# Patient Record
Sex: FEMALE | Race: WHITE
[De-identification: names, ages, dates, MRNs, and addresses within clinical notes are randomized per-mention and may not be internally consistent; named-entity substitution may affect disease eponyms.]

---

## 2017-04-19 ENCOUNTER — APPOINTMENT (OUTPATIENT)
Dept: SURGERY | Facility: CLINIC | Age: 52
End: 2017-04-19

## 2017-05-19 ENCOUNTER — APPOINTMENT (OUTPATIENT)
Dept: SURGERY | Facility: CLINIC | Age: 52
End: 2017-05-19

## 2017-05-19 VITALS
SYSTOLIC BLOOD PRESSURE: 138 MMHG | HEIGHT: 63.5 IN | BODY MASS INDEX: 51.27 KG/M2 | DIASTOLIC BLOOD PRESSURE: 84 MMHG | WEIGHT: 293 LBS

## 2017-05-19 DIAGNOSIS — Z82.49 FAMILY HISTORY OF ISCHEMIC HEART DISEASE AND OTHER DISEASES OF THE CIRCULATORY SYSTEM: ICD-10-CM

## 2017-05-19 DIAGNOSIS — Z87.891 PERSONAL HISTORY OF NICOTINE DEPENDENCE: ICD-10-CM

## 2017-05-19 DIAGNOSIS — C53.9 MALIGNANT NEOPLASM OF CERVIX UTERI, UNSPECIFIED: ICD-10-CM

## 2017-05-19 DIAGNOSIS — Z78.9 OTHER SPECIFIED HEALTH STATUS: ICD-10-CM

## 2017-05-19 RX ORDER — PROGESTERONE 200 MG/1
CAPSULE ORAL
Refills: 0 | Status: ACTIVE | COMMUNITY

## 2017-05-19 RX ORDER — ESTRADIOL 0.1MG/24HR
0.1 PATCH, TRANSDERMAL SEMIWEEKLY TRANSDERMAL
Refills: 0 | Status: ACTIVE | COMMUNITY

## 2017-05-25 ENCOUNTER — APPOINTMENT (OUTPATIENT)
Dept: SURGERY | Facility: CLINIC | Age: 52
End: 2017-05-25

## 2017-05-25 VITALS — HEIGHT: 63.5 IN | BODY MASS INDEX: 51.27 KG/M2 | WEIGHT: 293 LBS

## 2017-05-30 ENCOUNTER — OUTPATIENT (OUTPATIENT)
Dept: OUTPATIENT SERVICES | Facility: HOSPITAL | Age: 52
LOS: 1 days | Discharge: HOME | End: 2017-05-30

## 2017-05-31 ENCOUNTER — RESULT REVIEW (OUTPATIENT)
Age: 52
End: 2017-05-31

## 2017-06-12 ENCOUNTER — APPOINTMENT (OUTPATIENT)
Dept: SURGERY | Facility: CLINIC | Age: 52
End: 2017-06-12

## 2017-06-12 VITALS — WEIGHT: 293 LBS | BODY MASS INDEX: 51.27 KG/M2 | HEIGHT: 63.5 IN

## 2017-06-28 DIAGNOSIS — E66.01 MORBID (SEVERE) OBESITY DUE TO EXCESS CALORIES: ICD-10-CM

## 2017-07-18 ENCOUNTER — APPOINTMENT (OUTPATIENT)
Dept: SURGERY | Facility: CLINIC | Age: 52
End: 2017-07-18

## 2017-07-18 VITALS — WEIGHT: 292 LBS | HEIGHT: 63.5 IN | BODY MASS INDEX: 51.1 KG/M2

## 2017-08-07 ENCOUNTER — APPOINTMENT (OUTPATIENT)
Dept: SURGERY | Facility: CLINIC | Age: 52
End: 2017-08-07
Payer: COMMERCIAL

## 2017-08-07 VITALS — HEIGHT: 63.5 IN | BODY MASS INDEX: 50.57 KG/M2 | WEIGHT: 289 LBS

## 2017-08-07 PROCEDURE — 99212 OFFICE O/P EST SF 10 MIN: CPT

## 2017-08-14 ENCOUNTER — OUTPATIENT (OUTPATIENT)
Dept: OUTPATIENT SERVICES | Facility: HOSPITAL | Age: 52
LOS: 1 days | Discharge: HOME | End: 2017-08-14

## 2017-08-14 DIAGNOSIS — I10 ESSENTIAL (PRIMARY) HYPERTENSION: ICD-10-CM

## 2017-08-14 DIAGNOSIS — G47.33 OBSTRUCTIVE SLEEP APNEA (ADULT) (PEDIATRIC): ICD-10-CM

## 2017-08-14 DIAGNOSIS — E56.9 VITAMIN DEFICIENCY, UNSPECIFIED: ICD-10-CM

## 2017-08-14 DIAGNOSIS — R00.2 PALPITATIONS: ICD-10-CM

## 2017-08-14 DIAGNOSIS — E66.9 OBESITY, UNSPECIFIED: ICD-10-CM

## 2017-08-14 DIAGNOSIS — I36.0 NONRHEUMATIC TRICUSPID (VALVE) STENOSIS: ICD-10-CM

## 2017-08-14 DIAGNOSIS — E78.00 PURE HYPERCHOLESTEROLEMIA, UNSPECIFIED: ICD-10-CM

## 2017-09-26 ENCOUNTER — APPOINTMENT (OUTPATIENT)
Dept: SURGERY | Facility: CLINIC | Age: 52
End: 2017-09-26
Payer: COMMERCIAL

## 2017-09-26 VITALS — HEIGHT: 63.5 IN | BODY MASS INDEX: 50.47 KG/M2 | WEIGHT: 288.4 LBS

## 2017-09-26 PROCEDURE — 99212 OFFICE O/P EST SF 10 MIN: CPT

## 2017-10-12 ENCOUNTER — APPOINTMENT (OUTPATIENT)
Dept: SURGERY | Facility: CLINIC | Age: 52
End: 2017-10-12
Payer: COMMERCIAL

## 2017-10-12 VITALS — BODY MASS INDEX: 50.5 KG/M2 | HEIGHT: 63.5 IN | WEIGHT: 288.6 LBS

## 2017-10-12 PROCEDURE — 99212 OFFICE O/P EST SF 10 MIN: CPT

## 2017-12-05 ENCOUNTER — TRANSCRIPTION ENCOUNTER (OUTPATIENT)
Age: 52
End: 2017-12-05

## 2017-12-12 ENCOUNTER — OUTPATIENT (OUTPATIENT)
Dept: OUTPATIENT SERVICES | Facility: HOSPITAL | Age: 52
LOS: 1 days | Discharge: HOME | End: 2017-12-12

## 2017-12-12 DIAGNOSIS — E66.9 OBESITY, UNSPECIFIED: ICD-10-CM

## 2017-12-12 DIAGNOSIS — E66.01 MORBID (SEVERE) OBESITY DUE TO EXCESS CALORIES: ICD-10-CM

## 2017-12-12 DIAGNOSIS — Z01.818 ENCOUNTER FOR OTHER PREPROCEDURAL EXAMINATION: ICD-10-CM

## 2017-12-12 DIAGNOSIS — G47.33 OBSTRUCTIVE SLEEP APNEA (ADULT) (PEDIATRIC): ICD-10-CM

## 2017-12-12 DIAGNOSIS — I10 ESSENTIAL (PRIMARY) HYPERTENSION: ICD-10-CM

## 2017-12-13 ENCOUNTER — APPOINTMENT (OUTPATIENT)
Dept: SURGERY | Facility: CLINIC | Age: 52
End: 2017-12-13
Payer: COMMERCIAL

## 2017-12-13 VITALS
HEIGHT: 63.5 IN | SYSTOLIC BLOOD PRESSURE: 140 MMHG | DIASTOLIC BLOOD PRESSURE: 82 MMHG | BODY MASS INDEX: 49.56 KG/M2 | WEIGHT: 283.2 LBS

## 2017-12-13 PROCEDURE — 99215 OFFICE O/P EST HI 40 MIN: CPT

## 2017-12-26 ENCOUNTER — INPATIENT (INPATIENT)
Facility: HOSPITAL | Age: 52
LOS: 1 days | Discharge: HOME | End: 2017-12-28
Attending: SURGERY

## 2017-12-26 DIAGNOSIS — E66.01 MORBID (SEVERE) OBESITY DUE TO EXCESS CALORIES: ICD-10-CM

## 2017-12-26 DIAGNOSIS — I10 ESSENTIAL (PRIMARY) HYPERTENSION: ICD-10-CM

## 2017-12-26 DIAGNOSIS — G47.33 OBSTRUCTIVE SLEEP APNEA (ADULT) (PEDIATRIC): ICD-10-CM

## 2017-12-26 DIAGNOSIS — E66.9 OBESITY, UNSPECIFIED: ICD-10-CM

## 2017-12-28 ENCOUNTER — CLINICAL ADVICE (OUTPATIENT)
Age: 52
End: 2017-12-28

## 2017-12-29 ENCOUNTER — CLINICAL ADVICE (OUTPATIENT)
Age: 52
End: 2017-12-29

## 2018-01-02 ENCOUNTER — OUTPATIENT (OUTPATIENT)
Dept: OUTPATIENT SERVICES | Facility: HOSPITAL | Age: 53
LOS: 1 days | Discharge: HOME | End: 2018-01-02

## 2018-01-02 DIAGNOSIS — E66.01 MORBID (SEVERE) OBESITY DUE TO EXCESS CALORIES: ICD-10-CM

## 2018-01-02 DIAGNOSIS — I10 ESSENTIAL (PRIMARY) HYPERTENSION: ICD-10-CM

## 2018-01-02 DIAGNOSIS — Z87.891 PERSONAL HISTORY OF NICOTINE DEPENDENCE: ICD-10-CM

## 2018-01-02 DIAGNOSIS — E66.2 MORBID (SEVERE) OBESITY WITH ALVEOLAR HYPOVENTILATION: ICD-10-CM

## 2018-01-02 DIAGNOSIS — Z90.49 ACQUIRED ABSENCE OF OTHER SPECIFIED PARTS OF DIGESTIVE TRACT: ICD-10-CM

## 2018-01-02 DIAGNOSIS — Z85.41 PERSONAL HISTORY OF MALIGNANT NEOPLASM OF CERVIX UTERI: ICD-10-CM

## 2018-01-02 DIAGNOSIS — G47.33 OBSTRUCTIVE SLEEP APNEA (ADULT) (PEDIATRIC): ICD-10-CM

## 2018-01-02 DIAGNOSIS — E66.9 OBESITY, UNSPECIFIED: ICD-10-CM

## 2018-01-03 ENCOUNTER — APPOINTMENT (OUTPATIENT)
Dept: SURGERY | Facility: CLINIC | Age: 53
End: 2018-01-03
Payer: COMMERCIAL

## 2018-01-03 VITALS — BODY MASS INDEX: 45.78 KG/M2 | WEIGHT: 261.6 LBS | HEIGHT: 63.5 IN

## 2018-01-03 PROCEDURE — 99024 POSTOP FOLLOW-UP VISIT: CPT

## 2018-01-03 RX ORDER — CALCIUM CARBONATE/VITAMIN D3 500 MG-2.5
TABLET,CHEWABLE ORAL DAILY
Refills: 0 | Status: ACTIVE | COMMUNITY

## 2018-01-09 ENCOUNTER — OTHER (OUTPATIENT)
Age: 53
End: 2018-01-09

## 2018-01-10 ENCOUNTER — APPOINTMENT (OUTPATIENT)
Dept: SURGERY | Facility: CLINIC | Age: 53
End: 2018-01-10
Payer: COMMERCIAL

## 2018-01-24 ENCOUNTER — APPOINTMENT (OUTPATIENT)
Dept: SURGERY | Facility: CLINIC | Age: 53
End: 2018-01-24
Payer: COMMERCIAL

## 2018-01-24 VITALS — HEIGHT: 63.5 IN | WEIGHT: 254.4 LBS | BODY MASS INDEX: 44.52 KG/M2

## 2018-01-24 PROCEDURE — 99024 POSTOP FOLLOW-UP VISIT: CPT

## 2018-01-24 RX ORDER — OXYCODONE AND ACETAMINOPHEN 5; 325 MG/1; MG/1
5-325 TABLET ORAL EVERY 6 HOURS
Qty: 15 | Refills: 0 | Status: DISCONTINUED | COMMUNITY
Start: 2017-12-13 | End: 2018-01-24

## 2018-03-12 ENCOUNTER — LABORATORY RESULT (OUTPATIENT)
Age: 53
End: 2018-03-12

## 2018-03-12 ENCOUNTER — OUTPATIENT (OUTPATIENT)
Dept: OUTPATIENT SERVICES | Facility: HOSPITAL | Age: 53
LOS: 1 days | Discharge: HOME | End: 2018-03-12

## 2018-03-12 DIAGNOSIS — E66.01 MORBID (SEVERE) OBESITY DUE TO EXCESS CALORIES: ICD-10-CM

## 2018-03-21 ENCOUNTER — APPOINTMENT (OUTPATIENT)
Dept: SURGERY | Facility: CLINIC | Age: 53
End: 2018-03-21
Payer: COMMERCIAL

## 2018-03-21 VITALS — HEIGHT: 63.5 IN | BODY MASS INDEX: 40.98 KG/M2 | WEIGHT: 234.2 LBS

## 2018-03-21 PROCEDURE — 99024 POSTOP FOLLOW-UP VISIT: CPT

## 2018-03-21 RX ORDER — BIOTIN 10 MG
10000 TABLET ORAL
Refills: 0 | Status: ACTIVE | COMMUNITY

## 2018-03-28 ENCOUNTER — RX RENEWAL (OUTPATIENT)
Age: 53
End: 2018-03-28

## 2018-04-25 ENCOUNTER — APPOINTMENT (OUTPATIENT)
Dept: SURGERY | Facility: CLINIC | Age: 53
End: 2018-04-25
Payer: COMMERCIAL

## 2018-04-25 VITALS — BODY MASS INDEX: 39.65 KG/M2 | WEIGHT: 226.6 LBS | HEIGHT: 63.5 IN

## 2018-04-25 DIAGNOSIS — E66.01 MORBID (SEVERE) OBESITY DUE TO EXCESS CALORIES: ICD-10-CM

## 2018-04-25 DIAGNOSIS — G89.29 LOW BACK PAIN: ICD-10-CM

## 2018-04-25 DIAGNOSIS — Z86.79 PERSONAL HISTORY OF OTHER DISEASES OF THE CIRCULATORY SYSTEM: ICD-10-CM

## 2018-04-25 DIAGNOSIS — M54.5 LOW BACK PAIN: ICD-10-CM

## 2018-04-25 PROCEDURE — 99213 OFFICE O/P EST LOW 20 MIN: CPT

## 2018-04-25 RX ORDER — PANTOPRAZOLE 40 MG/1
40 TABLET, DELAYED RELEASE ORAL DAILY
Qty: 30 | Refills: 2 | Status: DISCONTINUED | COMMUNITY
Start: 2017-12-13 | End: 2018-04-25

## 2018-06-13 ENCOUNTER — OUTPATIENT (OUTPATIENT)
Dept: OUTPATIENT SERVICES | Facility: HOSPITAL | Age: 53
LOS: 1 days | Discharge: HOME | End: 2018-06-13

## 2018-06-13 DIAGNOSIS — E66.01 MORBID (SEVERE) OBESITY DUE TO EXCESS CALORIES: ICD-10-CM

## 2018-06-29 ENCOUNTER — APPOINTMENT (OUTPATIENT)
Dept: SURGERY | Facility: CLINIC | Age: 53
End: 2018-06-29
Payer: COMMERCIAL

## 2018-06-29 VITALS — HEIGHT: 63.5 IN | BODY MASS INDEX: 36.83 KG/M2 | WEIGHT: 210.5 LBS

## 2018-06-29 LAB
25(OH)D3 SERPL-MCNC: 40 NG/ML
ALBUMIN SERPL ELPH-MCNC: 3.8 G/DL
ALP BLD-CCNC: 60 U/L
ALT SERPL-CCNC: 12 U/L
ANION GAP SERPL CALC-SCNC: 19 MMOL/L
AST SERPL-CCNC: 16 U/L
BASOPHILS # BLD AUTO: 0.03 K/UL
BASOPHILS NFR BLD AUTO: 0.5 %
BILIRUB SERPL-MCNC: 0.6 MG/DL
BUN SERPL-MCNC: 15 MG/DL
CALCIUM SERPL-MCNC: 9.2 MG/DL
CALCIUM SERPL-MCNC: 9.2 MG/DL
CHLORIDE SERPL-SCNC: 101 MMOL/L
CHOLEST SERPL-MCNC: 167 MG/DL
CHOLEST/HDLC SERPL: 2.7 RATIO
CO2 SERPL-SCNC: 24 MMOL/L
CREAT SERPL-MCNC: 0.9 MG/DL
EOSINOPHIL # BLD AUTO: 0.03 K/UL
EOSINOPHIL NFR BLD AUTO: 0.5 %
FERRITIN SERPL-MCNC: 158 NG/ML
FOLATE RBC-MCNC: 2345 NG/ML
GLUCOSE SERPL-MCNC: 75 MG/DL
HCT VFR BLD CALC: 43.9 %
HCT VFR BLD CALC: 45 %
HDLC SERPL-MCNC: 63 MG/DL
HGB BLD-MCNC: 14.1 G/DL
IMM GRANULOCYTES NFR BLD AUTO: 0.2 %
IRON SATN MFR SERPL: 40 %
IRON SERPL-MCNC: 104 UG/DL
LDLC SERPL CALC-MCNC: 88 MG/DL
LYMPHOCYTES # BLD AUTO: 1.93 K/UL
LYMPHOCYTES NFR BLD AUTO: 30.7 %
MAN DIFF?: NORMAL
MCHC RBC-ENTMCNC: 30 PG
MCHC RBC-ENTMCNC: 32.1 G/DL
MCV RBC AUTO: 93.4 FL
MONOCYTES # BLD AUTO: 0.39 K/UL
MONOCYTES NFR BLD AUTO: 6.2 %
NEUTROPHILS # BLD AUTO: 3.9 K/UL
NEUTROPHILS NFR BLD AUTO: 61.9 %
PARATHYROID HORMONE INTACT: 48 PG/ML
PLATELET # BLD AUTO: 213 K/UL
POTASSIUM SERPL-SCNC: 4 MMOL/L
PROT SERPL-MCNC: 6.5 G/DL
RBC # BLD: 4.7 M/UL
RBC # FLD: 14 %
SODIUM SERPL-SCNC: 144 MMOL/L
TIBC SERPL-MCNC: 261 UG/DL
TRIGL SERPL-MCNC: 122 MG/DL
UIBC SERPL-MCNC: 157 UG/DL
VIT B1 SERPL-MCNC: 125.6 NMOL/L
VIT B12 SERPL-MCNC: >2000 PG/ML
WBC # FLD AUTO: 6.29 K/UL

## 2018-06-29 PROCEDURE — 99213 OFFICE O/P EST LOW 20 MIN: CPT

## 2018-06-29 RX ORDER — PEDIATRIC MULTIVITAMIN NO.17
TABLET,CHEWABLE ORAL DAILY
Refills: 0 | Status: ACTIVE | COMMUNITY

## 2018-07-27 PROBLEM — Z78.9 ALCOHOL USE: Status: ACTIVE | Noted: 2017-05-19

## 2018-09-19 ENCOUNTER — LABORATORY RESULT (OUTPATIENT)
Age: 53
End: 2018-09-19

## 2018-09-19 ENCOUNTER — OUTPATIENT (OUTPATIENT)
Dept: OUTPATIENT SERVICES | Facility: HOSPITAL | Age: 53
LOS: 1 days | Discharge: HOME | End: 2018-09-19

## 2018-09-19 DIAGNOSIS — K91.2 POSTSURGICAL MALABSORPTION, NOT ELSEWHERE CLASSIFIED: ICD-10-CM

## 2018-09-19 DIAGNOSIS — E78.89 OTHER LIPOPROTEIN METABOLISM DISORDERS: ICD-10-CM

## 2018-09-19 DIAGNOSIS — D51.1 VITAMIN B12 DEFICIENCY ANEMIA DUE TO SELECTIVE VITAMIN B12 MALABSORPTION WITH PROTEINURIA: ICD-10-CM

## 2018-09-19 DIAGNOSIS — E87.8 OTHER DISORDERS OF ELECTROLYTE AND FLUID BALANCE, NOT ELSEWHERE CLASSIFIED: ICD-10-CM

## 2018-09-19 DIAGNOSIS — D50.8 OTHER IRON DEFICIENCY ANEMIAS: ICD-10-CM

## 2018-09-19 DIAGNOSIS — E56.9 VITAMIN DEFICIENCY, UNSPECIFIED: ICD-10-CM

## 2018-10-12 ENCOUNTER — APPOINTMENT (OUTPATIENT)
Dept: SURGERY | Facility: CLINIC | Age: 53
End: 2018-10-12
Payer: COMMERCIAL

## 2018-10-12 VITALS — WEIGHT: 192.1 LBS | HEIGHT: 63.5 IN | BODY MASS INDEX: 33.62 KG/M2

## 2018-10-12 DIAGNOSIS — K21.9 GASTRO-ESOPHAGEAL REFLUX DISEASE W/OUT ESOPHAGITIS: ICD-10-CM

## 2018-10-12 PROCEDURE — 99213 OFFICE O/P EST LOW 20 MIN: CPT

## 2018-10-12 RX ORDER — PANTOPRAZOLE 40 MG/1
40 TABLET, DELAYED RELEASE ORAL DAILY
Qty: 30 | Refills: 2 | Status: DISCONTINUED | COMMUNITY
Start: 2018-03-28 | End: 2018-10-12

## 2019-01-07 ENCOUNTER — OUTPATIENT (OUTPATIENT)
Dept: OUTPATIENT SERVICES | Facility: HOSPITAL | Age: 54
LOS: 1 days | Discharge: HOME | End: 2019-01-07

## 2019-01-07 DIAGNOSIS — D50.1 SIDEROPENIC DYSPHAGIA: ICD-10-CM

## 2019-01-07 DIAGNOSIS — E21.3 HYPERPARATHYROIDISM, UNSPECIFIED: ICD-10-CM

## 2019-01-07 DIAGNOSIS — D50.8 OTHER IRON DEFICIENCY ANEMIAS: ICD-10-CM

## 2019-01-07 DIAGNOSIS — E87.8 OTHER DISORDERS OF ELECTROLYTE AND FLUID BALANCE, NOT ELSEWHERE CLASSIFIED: ICD-10-CM

## 2019-01-07 DIAGNOSIS — E78.89 OTHER LIPOPROTEIN METABOLISM DISORDERS: ICD-10-CM

## 2019-01-07 DIAGNOSIS — D51.1 VITAMIN B12 DEFICIENCY ANEMIA DUE TO SELECTIVE VITAMIN B12 MALABSORPTION WITH PROTEINURIA: ICD-10-CM

## 2019-01-07 DIAGNOSIS — E55.9 VITAMIN D DEFICIENCY, UNSPECIFIED: ICD-10-CM

## 2019-01-07 DIAGNOSIS — K91.2 POSTSURGICAL MALABSORPTION, NOT ELSEWHERE CLASSIFIED: ICD-10-CM

## 2019-01-07 DIAGNOSIS — E56.9 VITAMIN DEFICIENCY, UNSPECIFIED: ICD-10-CM

## 2019-01-14 ENCOUNTER — RESULT REVIEW (OUTPATIENT)
Age: 54
End: 2019-01-14

## 2019-01-14 LAB
25(OH)D3 SERPL-MCNC: 42 NG/ML
ALBUMIN SERPL ELPH-MCNC: 4.1 G/DL
ALP BLD-CCNC: 53 U/L
ALT SERPL-CCNC: 14 U/L
ANION GAP SERPL CALC-SCNC: 9 MMOL/L
AST SERPL-CCNC: 15 U/L
BASOPHILS # BLD AUTO: 0.02 K/UL
BASOPHILS NFR BLD AUTO: 0.5 %
BILIRUB SERPL-MCNC: 0.6 MG/DL
BUN SERPL-MCNC: 13 MG/DL
CALCIUM SERPL-MCNC: 9.2 MG/DL
CALCIUM SERPL-MCNC: 9.2 MG/DL
CHLORIDE SERPL-SCNC: 105 MMOL/L
CHOLEST SERPL-MCNC: 187 MG/DL
CHOLEST/HDLC SERPL: 2.6 RATIO
CO2 SERPL-SCNC: 31 MMOL/L
CREAT SERPL-MCNC: 0.8 MG/DL
EOSINOPHIL # BLD AUTO: 0.02 K/UL
EOSINOPHIL NFR BLD AUTO: 0.5 %
FERRITIN SERPL-MCNC: 173 NG/ML
FOLATE RBC-MCNC: 1407 NG/ML
GLUCOSE SERPL-MCNC: 87 MG/DL
HCT VFR BLD CALC: 43 %
HCT VFR BLD CALC: 43.2 %
HDLC SERPL-MCNC: 72 MG/DL
HGB BLD-MCNC: 13.8 G/DL
IMM GRANULOCYTES NFR BLD AUTO: 0.2 %
IRON SATN MFR SERPL: 48 %
IRON SERPL-MCNC: 135 UG/DL
LDLC SERPL CALC-MCNC: 114 MG/DL
LYMPHOCYTES # BLD AUTO: 1.56 K/UL
LYMPHOCYTES NFR BLD AUTO: 38.8 %
MAN DIFF?: NORMAL
MCHC RBC-ENTMCNC: 30.4 PG
MCHC RBC-ENTMCNC: 31.9 G/DL
MCV RBC AUTO: 95.2 FL
MONOCYTES # BLD AUTO: 0.29 K/UL
MONOCYTES NFR BLD AUTO: 7.2 %
NEUTROPHILS # BLD AUTO: 2.12 K/UL
NEUTROPHILS NFR BLD AUTO: 52.8 %
PARATHYROID HORMONE INTACT: 57 PG/ML
PLATELET # BLD AUTO: 193 K/UL
POTASSIUM SERPL-SCNC: 5.2 MMOL/L
PROT SERPL-MCNC: 6.6 G/DL
RBC # BLD: 4.54 M/UL
RBC # FLD: 13.2 %
SODIUM SERPL-SCNC: 145 MMOL/L
TIBC SERPL-MCNC: 282 UG/DL
TRIGL SERPL-MCNC: 82 MG/DL
UIBC SERPL-MCNC: 147 UG/DL
VIT B1 SERPL-MCNC: 141.7 NMOL/L
VIT B12 SERPL-MCNC: 1675 PG/ML
WBC # FLD AUTO: 4.02 K/UL

## 2019-01-18 ENCOUNTER — APPOINTMENT (OUTPATIENT)
Dept: SURGERY | Facility: CLINIC | Age: 54
End: 2019-01-18

## 2019-05-07 ENCOUNTER — OUTPATIENT (OUTPATIENT)
Dept: OUTPATIENT SERVICES | Facility: HOSPITAL | Age: 54
LOS: 1 days | Discharge: HOME | End: 2019-05-07
Payer: COMMERCIAL

## 2019-05-07 DIAGNOSIS — Z12.31 ENCOUNTER FOR SCREENING MAMMOGRAM FOR MALIGNANT NEOPLASM OF BREAST: ICD-10-CM

## 2019-05-07 PROCEDURE — 77063 BREAST TOMOSYNTHESIS BI: CPT | Mod: 26

## 2019-05-07 PROCEDURE — 77067 SCR MAMMO BI INCL CAD: CPT | Mod: 26

## 2019-05-13 ENCOUNTER — APPOINTMENT (OUTPATIENT)
Dept: SURGERY | Facility: CLINIC | Age: 54
End: 2019-05-13
Payer: COMMERCIAL

## 2019-05-13 VITALS — HEIGHT: 63.5 IN | BODY MASS INDEX: 32.69 KG/M2 | WEIGHT: 186.8 LBS

## 2019-05-13 PROCEDURE — 99213 OFFICE O/P EST LOW 20 MIN: CPT

## 2019-05-13 RX ORDER — NYSTATIN 100000 1/G
POWDER TOPICAL DAILY
Refills: 0 | Status: DISCONTINUED | COMMUNITY
End: 2019-05-13

## 2019-05-13 NOTE — ASSESSMENT
[FreeTextEntry1] : TJ CUNNINGHAM is a 53 year female seen today for Bariatric follow up visit. Patient is doing well with her weight loss goals.\par Patient is compliant with dietary guidelines.\par She eats 3 meals/day with an occasional healthful snack. Breakfast - egg, a slice of cheese with turkey.  Lunch - tuna fish or turkey and cheese rolled up.  Dinner - chicken, steak, shrimp with either a green salad or green leafy vegetables. Snacks - almonds, protein shake,potato chip or pretzels. Recommend that she reduce fatty and high calorie foods. Reviewed healthy protein snack ideas with patient.\par Fluid intake is adequate with mainly water, Crystal Light and 1 cup of coffee. \par She has not exercised in 1 month.  Made patient aware that if she wants to lose another 10 lbs she has to resume her exercise 4-5 days week and add weight bearing exercises.\par \par

## 2019-05-13 NOTE — REASON FOR VISIT
[Follow-Up Visit] : a follow-up visit for [Other___] : [unfilled] [FreeTextEntry2] : Sleeve Gastrectomy on 12/26/2017

## 2019-05-13 NOTE — HISTORY OF PRESENT ILLNESS
[Procedure: ___] : Procedure performed: [unfilled]  [Date of Surgery: ___] : Date of Surgery:   [unfilled] [Pre-Op Weight ___] : Pre-op weight was [unfilled] lbs [___ Years Post Op] : [unfilled] years [de-identified] : Patient had surgery approximately 1 1/2 years ago. \par She denies heartburn/reflux/vomiting and food intolerance.\par Her hypertension, GERD and LBP all resolved.  She is not using her CPAP machine and was again referred to pulmonology.\par \par

## 2019-05-13 NOTE — PLAN
[FreeTextEntry1] : PLAN: Resume exercise.\par            Support Group.\par            Follow up with pulmonary.\par            Return to office in 2 months.\par            Call with concerns.

## 2019-05-13 NOTE — DATA REVIEWED
[FreeTextEntry1] : \par Wt. change since last visit: -5.3 lbs \par %EWL: 58 \par TWL:96.4 lbs \par BMI: 32.5\par \par

## 2019-07-02 ENCOUNTER — APPOINTMENT (OUTPATIENT)
Dept: SURGERY | Facility: CLINIC | Age: 54
End: 2019-07-02

## 2020-05-20 ENCOUNTER — TRANSCRIPTION ENCOUNTER (OUTPATIENT)
Age: 55
End: 2020-05-20

## 2020-08-24 ENCOUNTER — INPATIENT (INPATIENT)
Facility: HOSPITAL | Age: 55
LOS: 3 days | Discharge: HOME | End: 2020-08-28
Attending: INTERNAL MEDICINE | Admitting: INTERNAL MEDICINE
Payer: COMMERCIAL

## 2020-08-24 VITALS
SYSTOLIC BLOOD PRESSURE: 119 MMHG | DIASTOLIC BLOOD PRESSURE: 70 MMHG | RESPIRATION RATE: 20 BRPM | TEMPERATURE: 97 F | OXYGEN SATURATION: 100 % | HEART RATE: 66 BPM

## 2020-08-24 LAB
ALBUMIN SERPL ELPH-MCNC: 4.3 G/DL — SIGNIFICANT CHANGE UP (ref 3.5–5.2)
ALP SERPL-CCNC: 46 U/L — SIGNIFICANT CHANGE UP (ref 30–115)
ALT FLD-CCNC: 19 U/L — SIGNIFICANT CHANGE UP (ref 0–41)
ANION GAP SERPL CALC-SCNC: 15 MMOL/L — HIGH (ref 7–14)
AST SERPL-CCNC: 21 U/L — SIGNIFICANT CHANGE UP (ref 0–41)
BASOPHILS # BLD AUTO: 0.02 K/UL — SIGNIFICANT CHANGE UP (ref 0–0.2)
BASOPHILS NFR BLD AUTO: 0.3 % — SIGNIFICANT CHANGE UP (ref 0–1)
BILIRUB SERPL-MCNC: 0.5 MG/DL — SIGNIFICANT CHANGE UP (ref 0.2–1.2)
BUN SERPL-MCNC: 19 MG/DL — SIGNIFICANT CHANGE UP (ref 10–20)
CALCIUM SERPL-MCNC: 9.5 MG/DL — SIGNIFICANT CHANGE UP (ref 8.5–10.1)
CHLORIDE SERPL-SCNC: 101 MMOL/L — SIGNIFICANT CHANGE UP (ref 98–110)
CO2 SERPL-SCNC: 24 MMOL/L — SIGNIFICANT CHANGE UP (ref 17–32)
CREAT SERPL-MCNC: 1 MG/DL — SIGNIFICANT CHANGE UP (ref 0.7–1.5)
EOSINOPHIL # BLD AUTO: 0 K/UL — SIGNIFICANT CHANGE UP (ref 0–0.7)
EOSINOPHIL NFR BLD AUTO: 0 % — SIGNIFICANT CHANGE UP (ref 0–8)
GLUCOSE SERPL-MCNC: 102 MG/DL — HIGH (ref 70–99)
HCT VFR BLD CALC: 43.5 % — SIGNIFICANT CHANGE UP (ref 37–47)
HGB BLD-MCNC: 14 G/DL — SIGNIFICANT CHANGE UP (ref 12–16)
IMM GRANULOCYTES NFR BLD AUTO: 0.3 % — SIGNIFICANT CHANGE UP (ref 0.1–0.3)
LYMPHOCYTES # BLD AUTO: 1.14 K/UL — LOW (ref 1.2–3.4)
LYMPHOCYTES # BLD AUTO: 14.6 % — LOW (ref 20.5–51.1)
MCHC RBC-ENTMCNC: 31.1 PG — HIGH (ref 27–31)
MCHC RBC-ENTMCNC: 32.2 G/DL — SIGNIFICANT CHANGE UP (ref 32–37)
MCV RBC AUTO: 96.7 FL — SIGNIFICANT CHANGE UP (ref 81–99)
MONOCYTES # BLD AUTO: 0.2 K/UL — SIGNIFICANT CHANGE UP (ref 0.1–0.6)
MONOCYTES NFR BLD AUTO: 2.6 % — SIGNIFICANT CHANGE UP (ref 1.7–9.3)
NEUTROPHILS # BLD AUTO: 6.41 K/UL — SIGNIFICANT CHANGE UP (ref 1.4–6.5)
NEUTROPHILS NFR BLD AUTO: 82.2 % — HIGH (ref 42.2–75.2)
NRBC # BLD: 0 /100 WBCS — SIGNIFICANT CHANGE UP (ref 0–0)
PLATELET # BLD AUTO: 205 K/UL — SIGNIFICANT CHANGE UP (ref 130–400)
POTASSIUM SERPL-MCNC: 5.4 MMOL/L — HIGH (ref 3.5–5)
POTASSIUM SERPL-SCNC: 5.4 MMOL/L — HIGH (ref 3.5–5)
PROT SERPL-MCNC: 7 G/DL — SIGNIFICANT CHANGE UP (ref 6–8)
RBC # BLD: 4.5 M/UL — SIGNIFICANT CHANGE UP (ref 4.2–5.4)
RBC # FLD: 13 % — SIGNIFICANT CHANGE UP (ref 11.5–14.5)
SODIUM SERPL-SCNC: 140 MMOL/L — SIGNIFICANT CHANGE UP (ref 135–146)
WBC # BLD: 7.79 K/UL — SIGNIFICANT CHANGE UP (ref 4.8–10.8)
WBC # FLD AUTO: 7.79 K/UL — SIGNIFICANT CHANGE UP (ref 4.8–10.8)

## 2020-08-24 PROCEDURE — 99285 EMERGENCY DEPT VISIT HI MDM: CPT

## 2020-08-24 RX ORDER — OXYCODONE AND ACETAMINOPHEN 5; 325 MG/1; MG/1
1 TABLET ORAL ONCE
Refills: 0 | Status: DISCONTINUED | OUTPATIENT
Start: 2020-08-24 | End: 2020-08-24

## 2020-08-24 RX ORDER — ENOXAPARIN SODIUM 100 MG/ML
40 INJECTION SUBCUTANEOUS DAILY
Refills: 0 | Status: DISCONTINUED | OUTPATIENT
Start: 2020-08-24 | End: 2020-08-27

## 2020-08-24 RX ORDER — CHLORHEXIDINE GLUCONATE 213 G/1000ML
1 SOLUTION TOPICAL
Refills: 0 | Status: DISCONTINUED | OUTPATIENT
Start: 2020-08-24 | End: 2020-08-28

## 2020-08-24 RX ORDER — METHOCARBAMOL 500 MG/1
1000 TABLET, FILM COATED ORAL ONCE
Refills: 0 | Status: COMPLETED | OUTPATIENT
Start: 2020-08-24 | End: 2020-08-24

## 2020-08-24 RX ORDER — KETOROLAC TROMETHAMINE 30 MG/ML
30 SYRINGE (ML) INJECTION ONCE
Refills: 0 | Status: DISCONTINUED | OUTPATIENT
Start: 2020-08-24 | End: 2020-08-24

## 2020-08-24 RX ORDER — MORPHINE SULFATE 50 MG/1
4 CAPSULE, EXTENDED RELEASE ORAL ONCE
Refills: 0 | Status: DISCONTINUED | OUTPATIENT
Start: 2020-08-24 | End: 2020-08-24

## 2020-08-24 RX ADMIN — OXYCODONE AND ACETAMINOPHEN 1 TABLET(S): 5; 325 TABLET ORAL at 18:19

## 2020-08-24 RX ADMIN — Medication 30 MILLIGRAM(S): at 18:02

## 2020-08-24 RX ADMIN — Medication 30 MILLIGRAM(S): at 19:07

## 2020-08-24 RX ADMIN — MORPHINE SULFATE 4 MILLIGRAM(S): 50 CAPSULE, EXTENDED RELEASE ORAL at 20:25

## 2020-08-24 RX ADMIN — OXYCODONE AND ACETAMINOPHEN 1 TABLET(S): 5; 325 TABLET ORAL at 20:00

## 2020-08-24 RX ADMIN — METHOCARBAMOL 1000 MILLIGRAM(S): 500 TABLET, FILM COATED ORAL at 18:02

## 2020-08-24 RX ADMIN — OXYCODONE AND ACETAMINOPHEN 1 TABLET(S): 5; 325 TABLET ORAL at 19:07

## 2020-08-24 RX ADMIN — OXYCODONE AND ACETAMINOPHEN 1 TABLET(S): 5; 325 TABLET ORAL at 19:18

## 2020-08-24 NOTE — ED PROVIDER NOTE - ATTENDING CONTRIBUTION TO CARE
56 y/o female denies sig PMH, PSH of bariatric surgery c/o acute onset rt lower back painx 1 week, started after exercising, sx are constant, worse with certain movements and position, better with rest, denies fever, tactile temp, chills, paresthesias, focal weakness, bowel or bladder dysfunction, urinary sx, LE weakness, saddle anesthesia, or other associated complaints at present. Pt denies recent heavy lifting or trauma.     No old chart available for review.  I have reviewed and agree with the nursing note, except as documented in my note.    VSS, awake, alert, appears uncomfortable, chest CTAB, +S1/S2, RRR, abdomen soft, NT, no pulsatile masses or bruits appreciated, no midline spinal tenderness, step-offs or deformities, no erythema, swelling or ecchymosis, no skin rash or lesions, able to dorsiflex b/l great toe, motor and sensation intact b/l LE, NV intact, unable to ambulate secondary to pain.

## 2020-08-24 NOTE — ED PROVIDER NOTE - OBJECTIVE STATEMENT
55 year old female, no significant past medical history, who presents with R lower back pain radiating to RLE x1 week. Patient reports she was stretching when she felt "pop" with gradual worsening of pain. Pain was lying supine today when pain worsened, has been unable to ambulate since. No fever, chills, IVDU, urinary/bowel incontinence, saddle anesthesias, UE/LE tingling/numbness. Denies recent falls or trauma. Patient reports hx similar in past x5 years ago, had MRI performed at that time, unknown results.

## 2020-08-24 NOTE — H&P ADULT - NSHPLABSRESULTS_GEN_ALL_CORE
14.0   7.79  )-----------( 205      ( 24 Aug 2020 20:10 )             43.5       08-24    140  |  101  |  19  ----------------------------<  102<H>  5.4<H>   |  24  |  1.0    Ca    9.5      24 Aug 2020 20:10    TPro  7.0  /  Alb  4.3  /  TBili  0.5  /  DBili  x   /  AST  21  /  ALT  19  /  AlkPhos  46  08-24                            CAPILLARY BLOOD GLUCOSE

## 2020-08-24 NOTE — ED PROVIDER NOTE - PHYSICAL EXAMINATION
CONSTITUTIONAL: Well-developed; well-nourished; in no acute distress, nontoxic appearing  SKIN: skin exam is warm and dry,  HEAD: Normocephalic; atraumatic.  NECK: ROM intact  CARD: S1, S2 normal, no murmur  RESP: No wheezes, rales or rhonchi. Good air movement bilaterally  ABD: soft; non-distended; non-tender. No Rebound, No guarding  EXT: +R paraspinal TTP overlying lumbar region. No midline tenderness. Normal ROM of extremities. Pulses 2+ bilaterally LE. Pelvis stable.    NEURO: awake, alert, following commands, oriented, grossly unremarkable. No Focal deficits. GCS 15.   PSYCH: Cooperative, appropriate.

## 2020-08-24 NOTE — ED ADULT TRIAGE NOTE - CHIEF COMPLAINT QUOTE
pt BIBA for severe hip pain that radiates into the leg. pt is not able to ambulate at this time. pt noted with injury a few days ago. no deformity noted to the hip

## 2020-08-24 NOTE — ED PROVIDER NOTE - NS ED ROS FT
Review of Systems:  	•	CONSTITUTIONAL: no fever, no diaphoresis, no chills  	•	SKIN: no rash  	•	HEMATOLOGIC: no bleeding, no bruising  	•	RESPIRATORY: no shortness of breath, no cough  	•	CARDIAC: no chest pain, no palpitations  	•	GI: no abd pain, no nausea, no vomiting, no diarrhea  	•	GENITO-URINARY:  no dysuria; no hematuria, no increased urinary frequency  	•	MUSCULOSKELETAL: +R lower back pain. no swelling, no redness  	•	NEUROLOGIC: no urinary/bowel incontinence, no saddle anesthesias, no LE tingling/numbness/paresthesias

## 2020-08-24 NOTE — H&P ADULT - NSHPPHYSICALEXAM_GEN_ALL_CORE
VITALS:     GENERAL: NAD, lying in bed comfortably  HEAD:  Atraumatic, Normocephalic  EYES: EOMI, PERRLA, conjunctiva and sclera clear  ENT: Moist mucous membranes  NECK: Supple, No JVD  CHEST/LUNG: Clear to auscultation bilaterally; No rales, rhonchi, wheezing, or rubs. Unlabored respirations  HEART: Regular rate and rhythm; No murmurs, rubs, or gallops  ABDOMEN: Bowel sounds present; Soft, Nontender, Nondistended. No hepatomegally  EXTREMITIES:  2+ Peripheral Pulses, brisk capillary refill. No clubbing, cyanosis, or edema  NERVOUS SYSTEM:  Alert & Oriented X3, speech clear. No deficits   MSK: FROM all 4 extremities, full and equal strength  SKIN: No rashes or lesions GENERAL: NAD, lying in bed comfortably  HEAD:  Atraumatic, Normocephalic  EYES: EOMI, PERRLA, conjunctiva and sclera clear  ENT: Moist mucous membranes  NECK: Supple, No JVD  CHEST/LUNG: Clear to auscultation bilaterally; No rales, rhonchi, wheezing, or rubs. Unlabored respirations  HEART: Regular rate and rhythm; No murmurs, rubs, or gallops  ABDOMEN: Bowel sounds present; Soft, Nontender, Nondistended.   EXTREMITIES:  2+ Peripheral Pulses, brisk capillary refill. No clubbing, cyanosis, or edema, no back paraspinal tenderness, STR leg raise test negative bilaterally  NERVOUS SYSTEM:  Alert & Oriented X3, speech clear. intact sensory, motor limited on the right by pain  MSK: FROM all 4 extremities,  SKIN: No rashes or lesions

## 2020-08-24 NOTE — H&P ADULT - ASSESSMENT
56 y/o female denies sig PMH, PSH of bariatric surgery c/o acute onset rt lower back painx 1 week.    #back pain  -pain control consult  -PT shahrzad  -c/w methacarbamol, tylenols percocet and morphine for breakthrough pain   -avoid NSAID as pt have hyperkalemia       Activity as toelrated,   Diet regular  DVT PPX lovenox   GI ppx not indicated   Dispo from home   Code full 56 y/o female denies sig PMH, PSH of bariatric surgery c/o acute onset rt lower back painx 1 week.    #Lower back, thigh and knee pain with paresthesia, most likely MSK  -acute pain control   -PT shahrzad and treat  -c/w methocarbamol tylenols percocet and morphine for breakthrough pain   -avoid NSAID as pt have hyperkalemia   -no indication for MRI of the back for now    Activity as tolerated   Diet regular  DVT PPX Lovenox   GI ppx not indicated   Dispo from home   Code full

## 2020-08-24 NOTE — H&P ADULT - HISTORY OF PRESENT ILLNESS
54 y/o female denies sig PMH, PSH of bariatric surgery c/o acute onset rt lower back painx 1 week.      In the ED was given percocet, morphine, methocarbamol and Toradol, admitted for pain control 56 y/o female no PMH, PSH of bariatric surgery c/o acute onset rt lower back painx 1 day.  Pt started home exercising 3 weeks, since then she had back pain radiating to the thighs and knees, worsened by any movement, improved with rest, associated with numbness at the same site, no focal weakness, no urinary or stool incontinence. no recent fall or trauma.       In the ED was given percocet, morphine, methocarbamol and Toradol, admitted for pain control

## 2020-08-24 NOTE — H&P ADULT - ATTENDING COMMENTS
56 YO F with no significant PMH, PSH of bariatric surgery who presents to the hospital with a c/o acute onset of right lower back pain for the past x 1 week that started after exercising. Denies any trauma/falls, bowel/bladder incontinence, saddle paresthesia, IVDA, rashes, fevers/chills, focal weakness, or sensation changes. In the ED, pt was given multiple rounds of pain medications and muscle-relaxants with minimal relief. Admitted for pain control.     Physical exam shows pt in NAD. VSS, afebrile, not hypoxic on RA. A&Ox3. Non-focal neuro exam. Muscle strength/sensation intact. CTA B/L with no W/C/R. RRR, no M/G/R. ABD is soft and non-tender, normoactive BSs. LEs without swelling, -SLR B/L, no back pain on palpation. No rashes. Labs and radiology as above.    Acute back pain, non-traumatic, likely musculoskeletal; doubt cord compression. Lidocaine patch. PRN pain meds. PT consult.     Hyperkalemia. Treat. Repeat BMP in the AM.     Restart home meds. DVT PPX. Inform PCP of pt's admission to hospital. My note supersedes the residents note.

## 2020-08-24 NOTE — ED PROVIDER NOTE - PROGRESS NOTE DETAILS
BH: pt still in pain despite multiple analgesics, will admit for further evaluation and management. BH: pt still in pain despite multiple analgesics, unable to ambulate, will admit for further evaluation and management.

## 2020-08-25 DIAGNOSIS — M54.9 DORSALGIA, UNSPECIFIED: ICD-10-CM

## 2020-08-25 LAB
ALBUMIN SERPL ELPH-MCNC: 3.9 G/DL — SIGNIFICANT CHANGE UP (ref 3.5–5.2)
ALP SERPL-CCNC: 72 U/L — SIGNIFICANT CHANGE UP (ref 30–115)
ALT FLD-CCNC: 43 U/L — HIGH (ref 0–41)
ANION GAP SERPL CALC-SCNC: 11 MMOL/L — SIGNIFICANT CHANGE UP (ref 7–14)
AST SERPL-CCNC: 51 U/L — HIGH (ref 0–41)
BASOPHILS # BLD AUTO: 0.02 K/UL — SIGNIFICANT CHANGE UP (ref 0–0.2)
BASOPHILS NFR BLD AUTO: 0.3 % — SIGNIFICANT CHANGE UP (ref 0–1)
BILIRUB SERPL-MCNC: 0.7 MG/DL — SIGNIFICANT CHANGE UP (ref 0.2–1.2)
BUN SERPL-MCNC: 17 MG/DL — SIGNIFICANT CHANGE UP (ref 10–20)
CALCIUM SERPL-MCNC: 9.1 MG/DL — SIGNIFICANT CHANGE UP (ref 8.5–10.1)
CHLORIDE SERPL-SCNC: 106 MMOL/L — SIGNIFICANT CHANGE UP (ref 98–110)
CO2 SERPL-SCNC: 26 MMOL/L — SIGNIFICANT CHANGE UP (ref 17–32)
CREAT SERPL-MCNC: 0.9 MG/DL — SIGNIFICANT CHANGE UP (ref 0.7–1.5)
EOSINOPHIL # BLD AUTO: 0.03 K/UL — SIGNIFICANT CHANGE UP (ref 0–0.7)
EOSINOPHIL NFR BLD AUTO: 0.5 % — SIGNIFICANT CHANGE UP (ref 0–8)
GLUCOSE SERPL-MCNC: 84 MG/DL — SIGNIFICANT CHANGE UP (ref 70–99)
HCT VFR BLD CALC: 40.9 % — SIGNIFICANT CHANGE UP (ref 37–47)
HCV AB S/CO SERPL IA: 0.04 COI — SIGNIFICANT CHANGE UP
HCV AB SERPL-IMP: SIGNIFICANT CHANGE UP
HGB BLD-MCNC: 13.3 G/DL — SIGNIFICANT CHANGE UP (ref 12–16)
IMM GRANULOCYTES NFR BLD AUTO: 0.2 % — SIGNIFICANT CHANGE UP (ref 0.1–0.3)
LYMPHOCYTES # BLD AUTO: 1.92 K/UL — SIGNIFICANT CHANGE UP (ref 1.2–3.4)
LYMPHOCYTES # BLD AUTO: 30.5 % — SIGNIFICANT CHANGE UP (ref 20.5–51.1)
MAGNESIUM SERPL-MCNC: 2.1 MG/DL — SIGNIFICANT CHANGE UP (ref 1.8–2.4)
MCHC RBC-ENTMCNC: 31.3 PG — HIGH (ref 27–31)
MCHC RBC-ENTMCNC: 32.5 G/DL — SIGNIFICANT CHANGE UP (ref 32–37)
MCV RBC AUTO: 96.2 FL — SIGNIFICANT CHANGE UP (ref 81–99)
MONOCYTES # BLD AUTO: 0.55 K/UL — SIGNIFICANT CHANGE UP (ref 0.1–0.6)
MONOCYTES NFR BLD AUTO: 8.7 % — SIGNIFICANT CHANGE UP (ref 1.7–9.3)
NEUTROPHILS # BLD AUTO: 3.76 K/UL — SIGNIFICANT CHANGE UP (ref 1.4–6.5)
NEUTROPHILS NFR BLD AUTO: 59.8 % — SIGNIFICANT CHANGE UP (ref 42.2–75.2)
NRBC # BLD: 0 /100 WBCS — SIGNIFICANT CHANGE UP (ref 0–0)
PLATELET # BLD AUTO: 189 K/UL — SIGNIFICANT CHANGE UP (ref 130–400)
POTASSIUM SERPL-MCNC: 5 MMOL/L — SIGNIFICANT CHANGE UP (ref 3.5–5)
POTASSIUM SERPL-SCNC: 5 MMOL/L — SIGNIFICANT CHANGE UP (ref 3.5–5)
PROT SERPL-MCNC: 6.1 G/DL — SIGNIFICANT CHANGE UP (ref 6–8)
RBC # BLD: 4.25 M/UL — SIGNIFICANT CHANGE UP (ref 4.2–5.4)
RBC # FLD: 13 % — SIGNIFICANT CHANGE UP (ref 11.5–14.5)
SARS-COV-2 RNA SPEC QL NAA+PROBE: SIGNIFICANT CHANGE UP
SODIUM SERPL-SCNC: 143 MMOL/L — SIGNIFICANT CHANGE UP (ref 135–146)
WBC # BLD: 6.29 K/UL — SIGNIFICANT CHANGE UP (ref 4.8–10.8)
WBC # FLD AUTO: 6.29 K/UL — SIGNIFICANT CHANGE UP (ref 4.8–10.8)

## 2020-08-25 PROCEDURE — 99223 1ST HOSP IP/OBS HIGH 75: CPT | Mod: AI

## 2020-08-25 RX ORDER — METHOCARBAMOL 500 MG/1
750 TABLET, FILM COATED ORAL
Refills: 0 | Status: DISCONTINUED | OUTPATIENT
Start: 2020-08-25 | End: 2020-08-27

## 2020-08-25 RX ORDER — MORPHINE SULFATE 50 MG/1
2 CAPSULE, EXTENDED RELEASE ORAL EVERY 6 HOURS
Refills: 0 | Status: DISCONTINUED | OUTPATIENT
Start: 2020-08-25 | End: 2020-08-25

## 2020-08-25 RX ORDER — OXYCODONE HYDROCHLORIDE 5 MG/1
5 TABLET ORAL EVERY 6 HOURS
Refills: 0 | Status: DISCONTINUED | OUTPATIENT
Start: 2020-08-25 | End: 2020-08-25

## 2020-08-25 RX ORDER — MORPHINE SULFATE 50 MG/1
2 CAPSULE, EXTENDED RELEASE ORAL EVERY 6 HOURS
Refills: 0 | Status: DISCONTINUED | OUTPATIENT
Start: 2020-08-25 | End: 2020-08-26

## 2020-08-25 RX ORDER — GABAPENTIN 400 MG/1
200 CAPSULE ORAL EVERY 8 HOURS
Refills: 0 | Status: DISCONTINUED | OUTPATIENT
Start: 2020-08-25 | End: 2020-08-26

## 2020-08-25 RX ORDER — METHOCARBAMOL 500 MG/1
750 TABLET, FILM COATED ORAL EVERY 6 HOURS
Refills: 0 | Status: DISCONTINUED | OUTPATIENT
Start: 2020-08-25 | End: 2020-08-25

## 2020-08-25 RX ORDER — ACETAMINOPHEN 500 MG
650 TABLET ORAL EVERY 6 HOURS
Refills: 0 | Status: DISCONTINUED | OUTPATIENT
Start: 2020-08-25 | End: 2020-08-26

## 2020-08-25 RX ORDER — OXYCODONE HYDROCHLORIDE 5 MG/1
10 TABLET ORAL EVERY 4 HOURS
Refills: 0 | Status: DISCONTINUED | OUTPATIENT
Start: 2020-08-25 | End: 2020-08-28

## 2020-08-25 RX ADMIN — OXYCODONE HYDROCHLORIDE 5 MILLIGRAM(S): 5 TABLET ORAL at 09:52

## 2020-08-25 RX ADMIN — MORPHINE SULFATE 2 MILLIGRAM(S): 50 CAPSULE, EXTENDED RELEASE ORAL at 21:18

## 2020-08-25 RX ADMIN — Medication 650 MILLIGRAM(S): at 11:48

## 2020-08-25 RX ADMIN — CHLORHEXIDINE GLUCONATE 1 APPLICATION(S): 213 SOLUTION TOPICAL at 05:05

## 2020-08-25 RX ADMIN — Medication 650 MILLIGRAM(S): at 12:21

## 2020-08-25 RX ADMIN — Medication 650 MILLIGRAM(S): at 05:06

## 2020-08-25 RX ADMIN — ENOXAPARIN SODIUM 40 MILLIGRAM(S): 100 INJECTION SUBCUTANEOUS at 11:50

## 2020-08-25 RX ADMIN — MORPHINE SULFATE 2 MILLIGRAM(S): 50 CAPSULE, EXTENDED RELEASE ORAL at 15:02

## 2020-08-25 RX ADMIN — Medication 650 MILLIGRAM(S): at 17:39

## 2020-08-25 RX ADMIN — MORPHINE SULFATE 2 MILLIGRAM(S): 50 CAPSULE, EXTENDED RELEASE ORAL at 01:57

## 2020-08-25 RX ADMIN — MORPHINE SULFATE 2 MILLIGRAM(S): 50 CAPSULE, EXTENDED RELEASE ORAL at 15:26

## 2020-08-25 RX ADMIN — OXYCODONE HYDROCHLORIDE 5 MILLIGRAM(S): 5 TABLET ORAL at 10:26

## 2020-08-25 RX ADMIN — GABAPENTIN 200 MILLIGRAM(S): 400 CAPSULE ORAL at 21:03

## 2020-08-25 RX ADMIN — Medication 650 MILLIGRAM(S): at 05:36

## 2020-08-25 RX ADMIN — METHOCARBAMOL 750 MILLIGRAM(S): 500 TABLET, FILM COATED ORAL at 11:48

## 2020-08-25 RX ADMIN — MORPHINE SULFATE 2 MILLIGRAM(S): 50 CAPSULE, EXTENDED RELEASE ORAL at 21:03

## 2020-08-25 RX ADMIN — MORPHINE SULFATE 2 MILLIGRAM(S): 50 CAPSULE, EXTENDED RELEASE ORAL at 01:42

## 2020-08-25 RX ADMIN — OXYCODONE HYDROCHLORIDE 5 MILLIGRAM(S): 5 TABLET ORAL at 15:29

## 2020-08-25 RX ADMIN — METHOCARBAMOL 750 MILLIGRAM(S): 500 TABLET, FILM COATED ORAL at 17:39

## 2020-08-25 NOTE — PROGRESS NOTE ADULT - SUBJECTIVE AND OBJECTIVE BOX
LENGTH OF HOSPITAL STAY: 1d      CHIEF COMPLAINT:   Patient is a 55y old  Female who presents with a chief complaint of lower back pain (25 Aug 2020 13:50)      HISTORY OF PRESENTING ILLNESS:    HPI:  56 y/o female no PMH, PSH of bariatric surgery c/o acute onset rt lower back painx 1 day.  Pt started home exercising 3 weeks, since then she had back pain radiating to the thighs and knees, worsened by any movement, improved with rest, associated with numbness at the same site, no focal weakness, no urinary or stool incontinence. no recent fall or trauma.     In the ED was given percocet, morphine, methocarbamol and Toradol, admitted for pain control (24 Aug 2020 23:41)    PAST MEDICAL & SURGICAL HISTORY  PAST MEDICAL & SURGICAL HISTORY:  No pertinent past medical history      ALLERGIES:  No Known Allergies    MEDICATIONS:  STANDING MEDICATIONS  acetaminophen   Tablet .. 650 milliGRAM(s) Oral every 6 hours  chlorhexidine 4% Liquid 1 Application(s) Topical <User Schedule>  enoxaparin Injectable 40 milliGRAM(s) SubCutaneous daily  methocarbamol 750 milliGRAM(s) Oral two times a day    PRN MEDICATIONS  morphine  - Injectable 2 milliGRAM(s) IV Push every 6 hours PRN  oxyCODONE    IR 5 milliGRAM(s) Oral every 6 hours PRN    VITALS:   T(F): 97.9  HR: 60  BP: 160/74  RR: 18  SpO2: 100%    PHYSICAL EXAM:  General: Patient in distress from back pain.     HEENT: Pupils equal, reactive to light symmetrically.    PULM: Clear to auscultation bilaterally, no significant sputum production.    CVS: Regular rate and rhythm, no murmurs, rubs, or gallops.    Abdomen: Soft, nondistended, nontender.    Extremities: No edema, nontender.    SKIN: Warm and well perfused, no rashes noted.      NEURO: Straight leg raise test: Shooting pain and numbness on the right side.               Sensation: Numbness over the RLE.               Gait: Antalgic gait.    LABS:                        13.3   6.29  )-----------( 189      ( 25 Aug 2020 07:28 )             40.9     08-25    143  |  106  |  17  ----------------------------<  84  5.0   |  26  |  0.9    Ca    9.1      25 Aug 2020 07:28  Mg     2.1     08-25    TPro  6.1  /  Alb  3.9  /  TBili  0.7  /  DBili  x   /  AST  51<H>  /  ALT  43<H>  /  AlkPhos  72  08-25          Assessment/Plan:  56 y/o female no PMH, PSH of bariatric surgery c/o acute onset rt lower back painx 1 day.  Pt started home exercising 3 weeks, since then she had back pain radiating to the thighs and knees, worsened by any movement, improved with rest, associated with numbness at the same site, no focal weakness, no urinary or stool incontinence. no recent fall or trauma.       # Lower back, thigh and knee pain with paresthesia  - Postive straight leg raise test; Numbness over the RLE. Severe back pain at rest.  - Pain medicine recs:  ·	Tylenol PO 650mg q6; Methocarbamol PO 750mg q6 x3 days  ·	Start Gabapentin PO 200mg q8  ·	Warm Compresses to right buttocks PRN;   - f/u Lumbosacral MRI w/out contrast  - f/u PT eval for Low back Pain.       Provider Handoff: f/u lumbosacral MRI - f/u PT eval.   Activity as tolerated   Diet regular  DVT PPX Lovenox   GI ppx not indicated   Dispo from home   Code full

## 2020-08-25 NOTE — CONSULT NOTE ADULT - SUBJECTIVE AND OBJECTIVE BOX
Chief Complaint:    54 y/o female no PMH, PSH of bariatric surgery c/o acute onset rt lower back painx 1 day. Patient lying in bed supine. Patient c/o pain to the left lower back with radiating pain to the buttocks and wrapping around the thigh to the ant. portion up to the knee.          In the ED was given percocet, morphine, methocarbamol and Toradol, admitted for pain control (24 Aug 2020 23:41)      Allergies    No Known Allergies    Intolerances        PAST MEDICAL & SURGICAL HISTORY:  No pertinent past medical history        SOCIAL HISTORY:  Denies Smoking, Alcohol, or Drug Use    No Known Allergies      Current PAIN MEDICATIONS:  acetaminophen   Tablet .. 650 milliGRAM(s) Oral every 6 hours  methocarbamol 750 milliGRAM(s) Oral every 6 hours PRN  oxyCODONE    IR 5 milliGRAM(s) Oral every 6 hours PRN    Heme:  enoxaparin Injectable 40 milliGRAM(s) SubCutaneous daily    Antibiotics:    Cardiovascular:    GI:    Endocrine:    All Other Medications:  chlorhexidine 4% Liquid 1 Application(s) Topical <User Schedule>      Vital Signs Last 24 Hrs  T(C): 35.6 (25 Aug 2020 10:10), Max: 37.3 (24 Aug 2020 22:45)  T(F): 96 (25 Aug 2020 10:10), Max: 99.2 (24 Aug 2020 22:45)  HR: 59 (25 Aug 2020 10:10) (56 - 83)  BP: 150/75 (25 Aug 2020 10:10) (119/70 - 161/84)  BP(mean): --  RR: 18 (25 Aug 2020 10:10) (17 - 20)  SpO2: 98% (25 Aug 2020 10:10) (98% - 100%)        LABS:                          13.3   6.29  )-----------( 189      ( 25 Aug 2020 07:28 )             40.9     08-25    143  |  106  |  17  ----------------------------<  84  5.0   |  26  |  0.9    Ca    9.1      25 Aug 2020 07:28  Mg     2.1     08-25    TPro  6.1  /  Alb  3.9  /  TBili  0.7  /  DBili  x   /  AST  51<H>  /  ALT  43<H>  /  AlkPhos  72  08-25          RADIOLOGY:  None        Drug Screen:        [ ]  SHARLENE  Reviewed on 8/25/20, 1:54P  There are no results for the search terms that you entered.

## 2020-08-25 NOTE — PHYSICAL THERAPY INITIAL EVALUATION ADULT - SPECIFY REASON(S)
Upon assessment , pt is independent with transfers and ambulation without an Assistive Device ; will not require skilled PT during this admission.

## 2020-08-25 NOTE — CONSULT NOTE ADULT - PROBLEM SELECTOR RECOMMENDATION 9
Con't acetaminophen   Tablet .. 650 milliGRAM(s) Oral every 6 hours  Con't methocarbamol 750 milliGRAM(s) Oral every 6 hours x 3 days  Change oxyCODONE IR 10 milliGRAM(s) Oral every 4 hours PRN  Start Neurontin 200mg PO Q8hrs standing  Start warm compress to right buttocks Q1hr x20 mins PRN   PT eval for lower back pain

## 2020-08-25 NOTE — CONSULT NOTE ADULT - ASSESSMENT
REVIEW OF SYSTEMS    General:	NAD   Skin/Breast:	Neg  Ophthalmologic:	Neg  ENMT: Neg	  Respiratory and Thorax: Neg	  Cardiovascular:	Neg  Gastrointestinal:	Neg  Genitourinary:	Neg  Musculoskeletal:	Neg  Neurological:	Neg  Psychiatric:	Neg  Hematology/Lymphatics:	Neg  Endocrine:	Neg        PHYSICAL EXAM:    GENERAL: NAD, well-groomed, well-developed  HEAD:  Atraumatic, Normocephalic  EYES: EOMI, PERRLA, conjunctiva and sclera clear  ENMT: No tonsillar erythema, exudates, or enlargement; Moist mucous membranes, Good dentition, No lesions  NECK: Supple, No JVD, Normal thyroid  NERVOUS SYSTEM:  Alert & Oriented X3, Good concentration; Motor Strength 5/5 B/L upper and lower extremities  CHEST/LUNG: Clear to percussion bilaterally; No rales, rhonchi, wheezing, or rubs  HEART: Regular rate and rhythm; No murmurs, rubs, or gallops  ABDOMEN: Soft, Nontender, Nondistended; Bowel sounds present  EXTREMITIES:  No clubbing, cyanosis, or edema  LYMPH: No lymphadenopathy noted  SKIN: No rashes or lesions      Patient with movement to bilat lower ext's. Patient states she has a MC son which weighs 50lbs and she has to lift him often. Patient has also started exercising at home. Pain is 10/10 with movement. Positive sensation. Pain is sharp and constant, radiating to the right leg. Pain has been bad since yesterday.

## 2020-08-25 NOTE — PHYSICAL THERAPY INITIAL EVALUATION ADULT - GENERAL OBSERVATIONS, REHAB EVAL
[id number] am Chart reviewed. Pt. seen semirecline in bed , in No apparent distress , + IV lock, c/o pain on right anterior thigh area ( 6/10 scale), pre medicated prior to activity.

## 2020-08-26 LAB
SARS-COV-2 IGG SERPL QL IA: NEGATIVE — SIGNIFICANT CHANGE UP
SARS-COV-2 IGM SERPL IA-ACNC: <3.8 AU/ML — SIGNIFICANT CHANGE UP

## 2020-08-26 PROCEDURE — 72148 MRI LUMBAR SPINE W/O DYE: CPT | Mod: 26

## 2020-08-26 PROCEDURE — 99233 SBSQ HOSP IP/OBS HIGH 50: CPT

## 2020-08-26 RX ORDER — OXYCODONE HYDROCHLORIDE 5 MG/1
10 TABLET ORAL EVERY 12 HOURS
Refills: 0 | Status: DISCONTINUED | OUTPATIENT
Start: 2020-08-26 | End: 2020-08-28

## 2020-08-26 RX ORDER — GABAPENTIN 400 MG/1
400 CAPSULE ORAL THREE TIMES A DAY
Refills: 0 | Status: DISCONTINUED | OUTPATIENT
Start: 2020-08-26 | End: 2020-08-28

## 2020-08-26 RX ORDER — OXYCODONE HYDROCHLORIDE 5 MG/1
15 TABLET ORAL EVERY 12 HOURS
Refills: 0 | Status: DISCONTINUED | OUTPATIENT
Start: 2020-08-26 | End: 2020-08-26

## 2020-08-26 RX ADMIN — GABAPENTIN 400 MILLIGRAM(S): 400 CAPSULE ORAL at 21:01

## 2020-08-26 RX ADMIN — OXYCODONE HYDROCHLORIDE 10 MILLIGRAM(S): 5 TABLET ORAL at 11:45

## 2020-08-26 RX ADMIN — OXYCODONE HYDROCHLORIDE 10 MILLIGRAM(S): 5 TABLET ORAL at 00:30

## 2020-08-26 RX ADMIN — METHOCARBAMOL 750 MILLIGRAM(S): 500 TABLET, FILM COATED ORAL at 17:00

## 2020-08-26 RX ADMIN — Medication 650 MILLIGRAM(S): at 05:40

## 2020-08-26 RX ADMIN — Medication 650 MILLIGRAM(S): at 11:45

## 2020-08-26 RX ADMIN — MORPHINE SULFATE 2 MILLIGRAM(S): 50 CAPSULE, EXTENDED RELEASE ORAL at 05:55

## 2020-08-26 RX ADMIN — Medication 650 MILLIGRAM(S): at 17:30

## 2020-08-26 RX ADMIN — Medication 650 MILLIGRAM(S): at 17:00

## 2020-08-26 RX ADMIN — OXYCODONE HYDROCHLORIDE 10 MILLIGRAM(S): 5 TABLET ORAL at 17:00

## 2020-08-26 RX ADMIN — OXYCODONE HYDROCHLORIDE 10 MILLIGRAM(S): 5 TABLET ORAL at 17:30

## 2020-08-26 RX ADMIN — GABAPENTIN 200 MILLIGRAM(S): 400 CAPSULE ORAL at 05:39

## 2020-08-26 RX ADMIN — CHLORHEXIDINE GLUCONATE 1 APPLICATION(S): 213 SOLUTION TOPICAL at 05:40

## 2020-08-26 RX ADMIN — Medication 650 MILLIGRAM(S): at 06:10

## 2020-08-26 RX ADMIN — OXYCODONE HYDROCHLORIDE 10 MILLIGRAM(S): 5 TABLET ORAL at 11:16

## 2020-08-26 RX ADMIN — Medication 650 MILLIGRAM(S): at 11:16

## 2020-08-26 RX ADMIN — METHOCARBAMOL 750 MILLIGRAM(S): 500 TABLET, FILM COATED ORAL at 05:40

## 2020-08-26 RX ADMIN — GABAPENTIN 400 MILLIGRAM(S): 400 CAPSULE ORAL at 15:05

## 2020-08-26 RX ADMIN — MORPHINE SULFATE 2 MILLIGRAM(S): 50 CAPSULE, EXTENDED RELEASE ORAL at 05:39

## 2020-08-26 RX ADMIN — OXYCODONE HYDROCHLORIDE 10 MILLIGRAM(S): 5 TABLET ORAL at 01:00

## 2020-08-26 NOTE — PROGRESS NOTE ADULT - SUBJECTIVE AND OBJECTIVE BOX
LENGTH OF HOSPITAL STAY: 2d      CHIEF COMPLAINT:   Patient is a 55y old  Female who presents with a chief complaint of lower back pain (25 Aug 2020 15:41)      OVER Past 24hrs:  The patient was seen and examined at bedside there were    HISTORY OF PRESENTING ILLNESS:    HPI:  54 y/o female no PMH, PSH of bariatric surgery c/o acute onset rt lower back painx 1 day.  Pt started home exercising 3 weeks, since then she had back pain radiating to the thighs and knees, worsened by any movement, improved with rest, associated with numbness at the same site, no focal weakness, no urinary or stool incontinence. no recent fall or trauma.       In the ED was given percocet, morphine, methocarbamol and Toradol, admitted for pain control (24 Aug 2020 23:41)    PAST MEDICAL & SURGICAL HISTORY  PAST MEDICAL & SURGICAL HISTORY:  No pertinent past medical history        REVIEW OF SYSTEMS  CONSTITUTIONAL: No fever, weight loss, or fatigue  EYES: No eye pain, visual disturbances, or discharge  ENMT:  No difficulty hearing, tinnitus, vertigo; No sinus or throat pain  NECK: No pain or stiffness  RESPIRATORY: No cough, wheezing, chills or hemoptysis; No shortness of breath  CARDIOVASCULAR: No chest pain, palpitations, dizziness, or leg swelling  GASTROINTESTINAL: No abdominal or epigastric pain. No nausea, vomiting, or hematemesis; No diarrhea or constipation. No melena or hematochezia.  GENITOURINARY: No dysuria, frequency, hematuria, or incontinence  NEUROLOGICAL: No headaches, memory loss, loss of strength, numbness, or tremors  SKIN: No itching, burning, rashes, or lesions   LYMPH NODES: No enlarged glands  ENDOCRINE: No heat or cold intolerance; No hair loss  MUSCULOSKELETAL: No joint pain or swelling; No muscle, back, or extremity pain  PSYCHIATRIC: No depression, anxiety, mood swings, or difficulty sleeping  HEME/LYMPH: No easy bruising, or bleeding gums  ALLERY AND IMMUNOLOGIC: No hives or eczema    ALLERGIES:  No Known Allergies    MEDICATIONS:  STANDING MEDICATIONS  acetaminophen   Tablet .. 650 milliGRAM(s) Oral every 6 hours  chlorhexidine 4% Liquid 1 Application(s) Topical <User Schedule>  enoxaparin Injectable 40 milliGRAM(s) SubCutaneous daily  gabapentin 200 milliGRAM(s) Oral every 8 hours  methocarbamol 750 milliGRAM(s) Oral two times a day    PRN MEDICATIONS  morphine  - Injectable 2 milliGRAM(s) IV Push every 6 hours PRN  oxyCODONE    IR 10 milliGRAM(s) Oral every 4 hours PRN    VITALS:   T(F): 98.1  HR: 58  BP: 153/75  RR: 18  SpO2: 100%    PHYSICAL EXAM:  General: No acute distress.  Alert, oriented, interactive, nonfocal.    HEENT: Pupils equal, reactive to light symmetrically.    PULM: Clear to auscultation bilaterally, no significant sputum production.    CVS: Regular rate and rhythm, no murmurs, rubs, or gallops.    Abdomen: Soft, nondistended, nontender.    Pelvis:    Extremities: No edema, nontender.    SKIN: Warm and well perfused, no rashes noted.    PSYCH:     NEURO:    LABS:                        13.3   6.29  )-----------( 189      ( 25 Aug 2020 07:28 )             40.9     08-25    143  |  106  |  17  ----------------------------<  84  5.0   |  26  |  0.9    Ca    9.1      25 Aug 2020 07:28  Mg     2.1     08-25    TPro  6.1  /  Alb  3.9  /  TBili  0.7  /  DBili  x   /  AST  51<H>  /  ALT  43<H>  /  AlkPhos  72  08-25                  RADIOLOGY:          Assessment/Plan:  54 y/o female no PMH, PSH of bariatric surgery c/o acute onset rt lower back painx 1 day.  Pt started home exercising 3 weeks, since then she had back pain radiating to the thighs and knees, worsened by any movement, improved with rest, associated with numbness at the same site, no focal weakness, no urinary or stool incontinence. no recent fall or trauma.       # Lower back, thigh and knee pain with paresthesia  - Postive straight leg raise test; Numbness over the RLE. Severe back pain at rest.  - Pain medicine recs:  ·	Tylenol PO 650mg q6; Methocarbamol PO 750mg q6 x3 days  ·	Start Gabapentin PO 200mg q8  ·	Warm Compresses to right buttocks PRN;   - f/u Lumbosacral MRI w/out contrast  - f/u PT eval for Low back Pain.       Provider Handoff: f/u lumbosacral MRI - f/u PT eval.   Activity as tolerated   Diet regular  DVT PPX Lovenox   GI ppx not indicated   Dispo from home   Code full LENGTH OF HOSPITAL STAY: 2d      CHIEF COMPLAINT:   Patient is a 55y old  Female who presents with a chief complaint of lower back pain (25 Aug 2020 15:41)      OVER Past 24hrs:  The patient was seen and examined at bedside there were no acute events overnight.     HISTORY OF PRESENTING ILLNESS:    HPI:  54 y/o female no PMH, PSH of bariatric surgery c/o acute onset rt lower back painx 1 day.  Pt started home exercising 3 weeks, since then she had back pain radiating to the thighs and knees, worsened by any movement, improved with rest, associated with numbness at the same site, no focal weakness, no urinary or stool incontinence. no recent fall or trauma.       In the ED was given percocet, morphine, methocarbamol and Toradol, admitted for pain control (24 Aug 2020 23:41)    PAST MEDICAL & SURGICAL HISTORY  PAST MEDICAL & SURGICAL HISTORY:  No pertinent past medical history      ALLERGIES:  No Known Allergies    MEDICATIONS:  STANDING MEDICATIONS  acetaminophen   Tablet .. 650 milliGRAM(s) Oral every 6 hours  chlorhexidine 4% Liquid 1 Application(s) Topical <User Schedule>  enoxaparin Injectable 40 milliGRAM(s) SubCutaneous daily  gabapentin 200 milliGRAM(s) Oral every 8 hours  methocarbamol 750 milliGRAM(s) Oral two times a day    PRN MEDICATIONS  morphine  - Injectable 2 milliGRAM(s) IV Push every 6 hours PRN  oxyCODONE    IR 10 milliGRAM(s) Oral every 4 hours PRN    VITALS:   T(F): 98.1  HR: 58  BP: 153/75  RR: 18  SpO2: 100%    PHYSICAL EXAM:  General: Patient in distress from back pain. Pain improved on meds.    HEENT: Pupils equal, reactive to light symmetrically.    PULM: Clear to auscultation bilaterally, no significant sputum production.    CVS: Regular rate and rhythm, no murmurs, rubs, or gallops.    Abdomen: Soft, nondistended, nontender.    Extremities: No edema, nontender.    SKIN: Warm and well perfused, no rashes noted.      NEURO: Straight leg raise test: Shooting pain and numbness on the right side.               Sensation: Numbness over the RLE.               Gait: Antalgic gait.    LABS:                        13.3   6.29  )-----------( 189      ( 25 Aug 2020 07:28 )             40.9     08-25    143  |  106  |  17  ----------------------------<  84  5.0   |  26  |  0.9    Ca    9.1      25 Aug 2020 07:28  Mg     2.1     08-25    TPro  6.1  /  Alb  3.9  /  TBili  0.7  /  DBili  x   /  AST  51<H>  /  ALT  43<H>  /  AlkPhos  72  08-25          Assessment/Plan:  54 y/o female no PMH, PSH of bariatric surgery c/o acute onset rt lower back painx 1 day.  Pt started home exercising 3 weeks, since then she had back pain radiating to the thighs and knees, worsened by any movement, improved with rest, associated with numbness at the same site, no focal weakness, no urinary or stool incontinence. No recent fall or trauma.       # Lower back, thigh and knee pain with paresthesia  - Postive straight leg raise test; Numbness over the RLE. Severe back pain at rest.  - Pain medicine recs:  ·	Tylenol PO 650mg q6; Methocarbamol PO 750mg q6 x3 days  ·	Start Gabapentin PO 200mg q8  ·	Warm Compresses to right buttocks PRN;   - Lumbosacral MRI w/out contrast: Pending report  - f/u PT eval for Low back Pain.       Provider Handoff: f/u lumbosacral MRI - f/u PT eval.   Activity as tolerated   Diet regular  DVT PPX Lovenox   GI ppx not indicated   Dispo from home   Code full LENGTH OF HOSPITAL STAY: 2d      CHIEF COMPLAINT:   Patient is a 55y old  Female who presents with a chief complaint of lower back pain (25 Aug 2020 15:41)      OVER Past 24hrs:  The patient was seen and examined at bedside there were no acute events overnight.     HISTORY OF PRESENTING ILLNESS:    HPI:  56 y/o female no PMH, PSH of bariatric surgery c/o acute onset rt lower back painx 1 day.  Pt started home exercising 3 weeks, since then she had back pain radiating to the thighs and knees, worsened by any movement, improved with rest, associated with numbness at the same site, no focal weakness, no urinary or stool incontinence. no recent fall or trauma.       In the ED was given percocet, morphine, methocarbamol and Toradol, admitted for pain control (24 Aug 2020 23:41)    PAST MEDICAL & SURGICAL HISTORY  PAST MEDICAL & SURGICAL HISTORY:  No pertinent past medical history      ALLERGIES:  No Known Allergies    MEDICATIONS:  STANDING MEDICATIONS  acetaminophen   Tablet .. 650 milliGRAM(s) Oral every 6 hours  chlorhexidine 4% Liquid 1 Application(s) Topical <User Schedule>  enoxaparin Injectable 40 milliGRAM(s) SubCutaneous daily  gabapentin 200 milliGRAM(s) Oral every 8 hours  methocarbamol 750 milliGRAM(s) Oral two times a day    PRN MEDICATIONS  morphine  - Injectable 2 milliGRAM(s) IV Push every 6 hours PRN  oxyCODONE    IR 10 milliGRAM(s) Oral every 4 hours PRN    VITALS:   T(F): 98.1  HR: 58  BP: 153/75  RR: 18  SpO2: 100%    PHYSICAL EXAM:  General: Patient in distress from back pain. Pain improved on meds.    HEENT: Pupils equal, reactive to light symmetrically.    PULM: Clear to auscultation bilaterally, no significant sputum production.    CVS: Regular rate and rhythm, no murmurs, rubs, or gallops.    Abdomen: Soft, nondistended, nontender.    Extremities: No edema, nontender.    SKIN: Warm and well perfused, no rashes noted.      NEURO: Straight leg raise test: Shooting pain and numbness on the right side.               Sensation: Numbness over the RLE.               Gait: Antalgic gait.    LABS:                        13.3   6.29  )-----------( 189      ( 25 Aug 2020 07:28 )             40.9     08-25    143  |  106  |  17  ----------------------------<  84  5.0   |  26  |  0.9    Ca    9.1      25 Aug 2020 07:28  Mg     2.1     08-25    TPro  6.1  /  Alb  3.9  /  TBili  0.7  /  DBili  x   /  AST  51<H>  /  ALT  43<H>  /  AlkPhos  72  08-25          Assessment/Plan:  56 y/o female no PMH, PSH of bariatric surgery c/o acute onset rt lower back painx 1 day.  Pt started home exercising 3 weeks, since then she had back pain radiating to the thighs and knees, worsened by any movement, improved with rest, associated with numbness at the same site, no focal weakness, no urinary or stool incontinence. No recent fall or trauma.       # Lower back, thigh and knee pain with paresthesia, suspected lumbar radiculopathy   - Postive straight leg raise test; Numbness over the RLE. Severe back pain at rest.  - Pain medicine recs:  ·	Tylenol PO 650mg q6; Methocarbamol PO 750mg q6 x3 days  ·	Start Gabapentin PO 200mg q8  ·	Warm Compresses to right buttocks PRN;   - Lumbosacral MRI w/out contrast: Pending report  - f/u PT eval for Low back Pain.       Provider Handoff: f/u lumbosacral MRI - f/u PT eval.   Activity as tolerated   Diet regular  DVT PPX Lovenox   GI ppx not indicated   Dispo from home   Code full

## 2020-08-26 NOTE — DIETITIAN INITIAL EVALUATION ADULT. - FACTORS AFF FOOD INTAKE
Pt reports poor appetite 2/2 pain. Documented po intake 0-75%. She did not eat much for breakfast this morning. Pt usually has a good appetite, 3 meals/day, healthy regular diet. LBM 8/24. No issues chewing/swallowing. NKFA. Takes MVI daily. No cultural /Nondenominational food preferences. pt had bariatric sx December 2017. pt declined all nutrition supplements.

## 2020-08-26 NOTE — DIETITIAN INITIAL EVALUATION ADULT. - PHYSICAL APPEARANCE
BMI 30.6  IBW: 125#. pt lost 130# since bariatric surgery. Now her weight is stable 183#. Skin intact./obese

## 2020-08-26 NOTE — DIETITIAN INITIAL EVALUATION ADULT. - ENERGY NEEDS
calories: 1433 - 1576 kcals/day (MSJ x 1.0 - 1.1 AF obesity)  protein: 66 - 83 gms/day (0.8 - 1.0 gm/kg CBW for obesity)  fluid: 1ml/kcal or per LIP

## 2020-08-26 NOTE — DIETITIAN INITIAL EVALUATION ADULT. - OTHER INFO
Pt adm for lower back pain. Lower back, thigh and knee pain with paresthesia.  f/u Lumbosacral MRI w/out contrast. PT eval.

## 2020-08-26 NOTE — PROGRESS NOTE ADULT - ATTENDING COMMENTS
Patient seen and examined. Continues to have pain on minimal movement. Increased gabapentin, started Oxycontin. C/w methocarbamol. Plan for MRI lumbar spine, done, pending to be read. Dc planning once pain controlled and patient able to ambulate independently.   Plan discussed with patient. Patient seen and examined. Continues to have pain on minimal movement. Increased gabapentin, started Oxycontin. C/w methocarbamol. Plan for MRI lumbar spine, done, pending to be read. Dc planning once pain controlled and patient able to ambulate independently.   Plan discussed with patient.    #Transaminitis - cytotoxic pattern, hold tylenol, repeat LFTs in AM

## 2020-08-27 LAB
ALBUMIN SERPL ELPH-MCNC: 4 G/DL — SIGNIFICANT CHANGE UP (ref 3.5–5.2)
ALP SERPL-CCNC: 120 U/L — HIGH (ref 30–115)
ALT FLD-CCNC: 91 U/L — HIGH (ref 0–41)
ANION GAP SERPL CALC-SCNC: 11 MMOL/L — SIGNIFICANT CHANGE UP (ref 7–14)
AST SERPL-CCNC: 49 U/L — HIGH (ref 0–41)
BILIRUB SERPL-MCNC: 0.3 MG/DL — SIGNIFICANT CHANGE UP (ref 0.2–1.2)
BUN SERPL-MCNC: 11 MG/DL — SIGNIFICANT CHANGE UP (ref 10–20)
CALCIUM SERPL-MCNC: 8.8 MG/DL — SIGNIFICANT CHANGE UP (ref 8.5–10.1)
CHLORIDE SERPL-SCNC: 101 MMOL/L — SIGNIFICANT CHANGE UP (ref 98–110)
CO2 SERPL-SCNC: 27 MMOL/L — SIGNIFICANT CHANGE UP (ref 17–32)
CREAT SERPL-MCNC: 0.9 MG/DL — SIGNIFICANT CHANGE UP (ref 0.7–1.5)
GLUCOSE SERPL-MCNC: 104 MG/DL — HIGH (ref 70–99)
POTASSIUM SERPL-MCNC: 4.2 MMOL/L — SIGNIFICANT CHANGE UP (ref 3.5–5)
POTASSIUM SERPL-SCNC: 4.2 MMOL/L — SIGNIFICANT CHANGE UP (ref 3.5–5)
PROT SERPL-MCNC: 6.3 G/DL — SIGNIFICANT CHANGE UP (ref 6–8)
SODIUM SERPL-SCNC: 139 MMOL/L — SIGNIFICANT CHANGE UP (ref 135–146)

## 2020-08-27 PROCEDURE — 99223 1ST HOSP IP/OBS HIGH 75: CPT

## 2020-08-27 PROCEDURE — 99233 SBSQ HOSP IP/OBS HIGH 50: CPT

## 2020-08-27 RX ORDER — METHOCARBAMOL 500 MG/1
750 TABLET, FILM COATED ORAL EVERY 8 HOURS
Refills: 0 | Status: DISCONTINUED | OUTPATIENT
Start: 2020-08-27 | End: 2020-08-27

## 2020-08-27 RX ORDER — DEXAMETHASONE 0.5 MG/5ML
4 ELIXIR ORAL EVERY 6 HOURS
Refills: 0 | Status: DISCONTINUED | OUTPATIENT
Start: 2020-08-27 | End: 2020-08-27

## 2020-08-27 RX ORDER — DEXAMETHASONE 0.5 MG/5ML
10 ELIXIR ORAL ONCE
Refills: 0 | Status: DISCONTINUED | OUTPATIENT
Start: 2020-08-27 | End: 2020-08-27

## 2020-08-27 RX ORDER — DEXAMETHASONE 0.5 MG/5ML
10 ELIXIR ORAL ONCE
Refills: 0 | Status: COMPLETED | OUTPATIENT
Start: 2020-08-27 | End: 2020-08-27

## 2020-08-27 RX ORDER — DEXAMETHASONE 0.5 MG/5ML
4 ELIXIR ORAL EVERY 6 HOURS
Refills: 0 | Status: DISCONTINUED | OUTPATIENT
Start: 2020-08-27 | End: 2020-08-28

## 2020-08-27 RX ORDER — METHOCARBAMOL 500 MG/1
750 TABLET, FILM COATED ORAL EVERY 8 HOURS
Refills: 0 | Status: DISCONTINUED | OUTPATIENT
Start: 2020-08-27 | End: 2020-08-28

## 2020-08-27 RX ADMIN — OXYCODONE HYDROCHLORIDE 10 MILLIGRAM(S): 5 TABLET ORAL at 11:40

## 2020-08-27 RX ADMIN — Medication 4 MILLIGRAM(S): at 22:12

## 2020-08-27 RX ADMIN — GABAPENTIN 400 MILLIGRAM(S): 400 CAPSULE ORAL at 22:12

## 2020-08-27 RX ADMIN — OXYCODONE HYDROCHLORIDE 10 MILLIGRAM(S): 5 TABLET ORAL at 17:34

## 2020-08-27 RX ADMIN — OXYCODONE HYDROCHLORIDE 10 MILLIGRAM(S): 5 TABLET ORAL at 05:17

## 2020-08-27 RX ADMIN — METHOCARBAMOL 750 MILLIGRAM(S): 500 TABLET, FILM COATED ORAL at 05:17

## 2020-08-27 RX ADMIN — OXYCODONE HYDROCHLORIDE 10 MILLIGRAM(S): 5 TABLET ORAL at 17:04

## 2020-08-27 RX ADMIN — OXYCODONE HYDROCHLORIDE 10 MILLIGRAM(S): 5 TABLET ORAL at 11:10

## 2020-08-27 RX ADMIN — GABAPENTIN 400 MILLIGRAM(S): 400 CAPSULE ORAL at 13:31

## 2020-08-27 RX ADMIN — GABAPENTIN 400 MILLIGRAM(S): 400 CAPSULE ORAL at 05:17

## 2020-08-27 RX ADMIN — Medication 10 MILLIGRAM(S): at 17:04

## 2020-08-27 RX ADMIN — OXYCODONE HYDROCHLORIDE 10 MILLIGRAM(S): 5 TABLET ORAL at 05:47

## 2020-08-27 RX ADMIN — OXYCODONE HYDROCHLORIDE 10 MILLIGRAM(S): 5 TABLET ORAL at 15:15

## 2020-08-27 RX ADMIN — OXYCODONE HYDROCHLORIDE 10 MILLIGRAM(S): 5 TABLET ORAL at 15:45

## 2020-08-27 RX ADMIN — CHLORHEXIDINE GLUCONATE 1 APPLICATION(S): 213 SOLUTION TOPICAL at 05:18

## 2020-08-27 NOTE — CONSULT NOTE ADULT - SUBJECTIVE AND OBJECTIVE BOX
54 yo female s/p bariatric surgery began to exercise following her surgery and began to experience some low back discomfort. After having a gentle massage experienced difficulty standing up as well as pain in her lower back, radiating into her RIGHT leg. The leg pain resolved however, she began to experience numbness in the RIGHT lower leg. Denies any numbness, tingling or weakness in the LLE. No complaints of bowel or bladder incontinence at this time    Admitting HPI:  54 y/o female no PMH, PSH of bariatric surgery c/o acute onset rt lower back pain x 1 day.  Pt started home exercising 3 weeks, since then she had back pain radiating to the thighs and knees, worsened by any movement, improved with rest, associated with numbness at the same site, no focal weakness, no urinary or stool incontinence. no recent fall or trauma.       In the ED was given percocet, morphine, methocarbamol and Toradol, admitted for pain control (24 Aug 2020 23:41)    Denies headache, weakness, numbness, n/v/d, fevers, chills, chest pain, SOB.   REVIEW OF SYSTEMS:      All other systems  reviewed as negative other than what was mentined in the HPI      Vital Signs Last 24 Hrs  T(C): 35.8 (27 Aug 2020 05:04), Max: 36.4 (26 Aug 2020 12:26)  T(F): 96.5 (27 Aug 2020 05:04), Max: 97.6 (26 Aug 2020 12:26)  HR: 51 (27 Aug 2020 05:04) (51 - 68)  BP: 174/74 (27 Aug 2020 05:04) (127/64 - 174/74)  BP(mean): --  RR: 18 (27 Aug 2020 05:04) (16 - 18)  SpO2: 98% (27 Aug 2020 07:49) (98% - 98%)    PHYSICAL EXAM:  GENERAL: NAD, well-groomed, well-developed sitting on the edge of the bed  HEAD:  Atraumatic, normocephalic    MENTAL STATUS: AAO x3; Awake; Opens eyes spontaneously; Appropriately conversant without aphasia; following simple commands  CRANIAL NERVES: Visual acuity normal for age, visual fields full to confrontation, PERRL. EOMI without nystagmus. Facial sensation intact V1-3 distribution b/l. Face symmetric w/ normal eye closure and smile, tongue midline. Hearing grossly intact. Speech clear. Head turning and shoulder shrug intact.   REFLEXES: PERRL. Corneals intact b/l. Gag intact. Cough intact. . Negative Johnson's b/l. Negative clonus b/l    Negative SLR  MOTOR: strength 5/5 b/l upper and lower extremities    SENSATION: Increased sensation to light touch RIGHT medial and lateral calf; otherwise grossly intact to light touch all extremities  COORDINATION: Antalgic gait with shuffled steps intact;     ABDOMEN: Soft, nontender, nondistended;   EXTREMITIES:  2+ peripheral pulses, no clubbing, cyanosis, or edema  SKIN: Warm, dry; no rashes or lesion      RADIOLOGY & ADDITIONAL TESTS:   MRI lumbar spine dated 8/26/20 demonstrates an L4-5 moderate right foraminal disc herniation impinging the exiting right L4 nerve. Also noted is an L4-5 disc bulge and facet arthropathy with moderate/severe right foraminal stenosis and mild left foraminal stenosis.    Imaging reviewed by attending

## 2020-08-27 NOTE — PROGRESS NOTE ADULT - SUBJECTIVE AND OBJECTIVE BOX
Pain Management Progress Note -     55y  female no PMH, PSH of bariatric surgery c/o acute onset rt lower back pain. Pain is 7/x 1 day. now, zaki. with movement. Patient con't to have the pain to the lower, but is getting some relief with pain meds. Patient states she is getting an injection to her back tomorrow. Movement and sensation present to all ext's.          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      chlorhexidine 4% Liquid  oxyCODONE    IR  gabapentin  oxyCODONE  ER Tablet  methocarbamol  dexAMETHasone     Tablet      chlorhexidine 4% Liquid 1 Application(s) Topical <User Schedule>  dexAMETHasone     Tablet 4 milliGRAM(s) Oral every 6 hours  gabapentin 400 milliGRAM(s) Oral three times a day  methocarbamol 750 milliGRAM(s) Oral every 8 hours PRN  oxyCODONE    IR 10 milliGRAM(s) Oral every 4 hours PRN  oxyCODONE  ER Tablet 10 milliGRAM(s) Oral every 12 hours      08-27 @ 12:5172 mL/min/1.73M2                T(C): 35.7 (08-27-20 @ 14:07), Max: 35.8 (08-27-20 @ 05:04)  HR: 62 (08-27-20 @ 14:07) (51 - 62)  BP: 132/82 (08-27-20 @ 14:07) (132/82 - 174/74)  RR: 20 (08-27-20 @ 14:07) (18 - 20)  SpO2: 98% (08-27-20 @ 07:49) (98% - 98%)  Wt(kg): --     REVIEW OF SYSTEMS    General:	NAD   Skin/Breast:	Neg  Ophthalmologic:	Neg  ENMT: Neg	  Respiratory and Thorax: Neg	  Cardiovascular:	Neg  Gastrointestinal:	Neg  Genitourinary:	Neg  Musculoskeletal:	Neg  Neurological:	Neg  Psychiatric:	Neg  Hematology/Lymphatics:	Neg  Endocrine:	Neg        PHYSICAL EXAM:    GENERAL: NAD, well-groomed, well-developed  HEAD:  Atraumatic, Normocephalic  EYES: EOMI, PERRLA, conjunctiva and sclera clear  ENMT: No tonsillar erythema, exudates, or enlargement; Moist mucous membranes, Good dentition, No lesions  NECK: Supple, No JVD, Normal thyroid  NERVOUS SYSTEM:  Alert & Oriented X3, Good concentration; Motor Strength 5/5 B/L upper and lower extremities  CHEST/LUNG: Clear to percussion bilaterally; No rales, rhonchi, wheezing, or rubs  HEART: Regular rate and rhythm; No murmurs, rubs, or gallops  ABDOMEN: Soft, Nontender, Nondistended; Bowel sounds present  EXTREMITIES:  No clubbing, cyanosis, or edema  LYMPH: No lymphadenopathy noted  SKIN: No rashes or lesions

## 2020-08-27 NOTE — CONSULT NOTE ADULT - ATTENDING COMMENTS
AS above. Pt seen and examined. Pt with concordant L4 radic and L4-5 foraminal disc herniation with stenosis. D/w Dr. Seay and patient. Patient would like to try further conservative measures incuding steroid injection, medication management. Dr. Seay to perform transforaminal ARIELLA at right L4-5 in AM. If no improvement over weekend will discuss surgical intervention
Agree with above recommendations.    1.  MRI pending.  Will re access after MRI

## 2020-08-27 NOTE — PROGRESS NOTE ADULT - ATTENDING COMMENTS
Pt seen and examined independently. She still c/o right pain of the buttock radiating to the leg and numbness of anterior leg.  No weakness, incontinence. She is able to ambulate but with difficulty and pain.  MRI report reviewed - L4-L5 disc herniation    < from: MR Lumbar Spine No Cont (08.26.20 @ 18:16) >    IMPRESSION:    1.  L4-5 moderate right foraminal disc herniation impinging the exiting right L4 nerve.    2.  Underlying L4-5 disc bulge and facet arthropathy with moderate/severe right foraminal stenosis and mild left foraminal stenosis.    3.  Additional mild degenerative changes as described.    < end of copied text >    NS consult in progress - IR consulted for injection tomorrow  add decadron  hold lovenox    I discussed the case with the resident and I reviewed his note.  Agree with the physical exam, assessment and plan with additions as above.     PROGRESS NOTE HANDOFF    Pending: steroid injection tomorrow by IR    Disposition: home

## 2020-08-27 NOTE — PROGRESS NOTE ADULT - SUBJECTIVE AND OBJECTIVE BOX
LENGTH OF HOSPITAL STAY: 3d      CHIEF COMPLAINT:   Patient is a 55y old  Female who presents with a chief complaint of lower back pain (26 Aug 2020 07:03)      OVER Past 24hrs:  The patient was seen and examined at bedside there were    HISTORY OF PRESENTING ILLNESS:    HPI:  54 y/o female no PMH, PSH of bariatric surgery c/o acute onset rt lower back painx 1 day.  Pt started home exercising 3 weeks, since then she had back pain radiating to the thighs and knees, worsened by any movement, improved with rest, associated with numbness at the same site, no focal weakness, no urinary or stool incontinence. no recent fall or trauma.       In the ED was given percocet, morphine, methocarbamol and Toradol, admitted for pain control (24 Aug 2020 23:41)    PAST MEDICAL & SURGICAL HISTORY  PAST MEDICAL & SURGICAL HISTORY:  No pertinent past medical history        REVIEW OF SYSTEMS  CONSTITUTIONAL: No fever, weight loss, or fatigue  EYES: No eye pain, visual disturbances, or discharge  ENMT:  No difficulty hearing, tinnitus, vertigo; No sinus or throat pain  NECK: No pain or stiffness  RESPIRATORY: No cough, wheezing, chills or hemoptysis; No shortness of breath  CARDIOVASCULAR: No chest pain, palpitations, dizziness, or leg swelling  GASTROINTESTINAL: No abdominal or epigastric pain. No nausea, vomiting, or hematemesis; No diarrhea or constipation. No melena or hematochezia.  GENITOURINARY: No dysuria, frequency, hematuria, or incontinence  NEUROLOGICAL: No headaches, memory loss, loss of strength, numbness, or tremors  SKIN: No itching, burning, rashes, or lesions   LYMPH NODES: No enlarged glands  ENDOCRINE: No heat or cold intolerance; No hair loss  MUSCULOSKELETAL: No joint pain or swelling; No muscle, back, or extremity pain  PSYCHIATRIC: No depression, anxiety, mood swings, or difficulty sleeping  HEME/LYMPH: No easy bruising, or bleeding gums  ALLERY AND IMMUNOLOGIC: No hives or eczema    ALLERGIES:  No Known Allergies    MEDICATIONS:  STANDING MEDICATIONS  chlorhexidine 4% Liquid 1 Application(s) Topical <User Schedule>  enoxaparin Injectable 40 milliGRAM(s) SubCutaneous daily  gabapentin 400 milliGRAM(s) Oral three times a day  methocarbamol 750 milliGRAM(s) Oral two times a day  oxyCODONE  ER Tablet 10 milliGRAM(s) Oral every 12 hours    PRN MEDICATIONS  oxyCODONE    IR 10 milliGRAM(s) Oral every 4 hours PRN    VITALS:   T(F): 96.5  HR: 51  BP: 174/74  RR: 18  SpO2: 98%    PHYSICAL EXAM:  General: Patient in distress from back pain. Pain improved on meds.    HEENT: Pupils equal, reactive to light symmetrically.    PULM: Clear to auscultation bilaterally, no significant sputum production.    CVS: Regular rate and rhythm, no murmurs, rubs, or gallops.    Abdomen: Soft, nondistended, nontender.    Extremities: No edema, nontender.    SKIN: Warm and well perfused, no rashes noted.      NEURO: Straight leg raise test: Shooting pain and numbness on the right side.               Sensation: Numbness over the RLE.               Gait: Antalgic gait.      LABS:                        13.3   6.29  )-----------( 189      ( 25 Aug 2020 07:28 )             40.9     08-25    143  |  106  |  17  ----------------------------<  84  5.0   |  26  |  0.9    Ca    9.1      25 Aug 2020 07:28  Mg     2.1     08-25    TPro  6.1  /  Alb  3.9  /  TBili  0.7  /  DBili  x   /  AST  51<H>  /  ALT  43<H>  /  AlkPhos  72  08-25                  RADIOLOGY:      Assessment/Plan:  54 y/o female no PMH, PSH of bariatric surgery c/o acute onset rt lower back painx 1 day.  Pt started home exercising 3 weeks, since then she had back pain radiating to the thighs and knees, worsened by any movement, improved with rest, associated with numbness at the same site, no focal weakness, no urinary or stool incontinence. No recent fall or trauma.       # Lower back, thigh and knee pain with paresthesia, suspected lumbar radiculopathy   - Postive straight leg raise test; Numbness over the RLE. Severe back pain at rest.  - Pain medicine recs:  ·	Tylenol PO 650mg q6; Methocarbamol PO 750mg q6 x3 days  ·	Start Gabapentin PO 200mg q8  ·	Warm Compresses to right buttocks PRN;   - Lumbosacral MRI w/out contrast: Pending report  - f/u PT eval for Low back Pain.       Provider Handoff: f/u lumbosacral MRI - f/u PT eval.   Activity as tolerated   Diet regular  DVT PPX Lovenox   GI ppx not indicated   Dispo from home   Code full LENGTH OF HOSPITAL STAY: 3d      CHIEF COMPLAINT:   Patient is a 55y old  Female who presents with a chief complaint of lower back pain (26 Aug 2020 07:03)      OVER Past 24hrs:  The patient was seen and examined at bedside there were no acute events overnight.     HISTORY OF PRESENTING ILLNESS:    HPI:  54 y/o female no PMH, PSH of bariatric surgery c/o acute onset rt lower back painx 1 day.  Pt started home exercising 3 weeks, since then she had back pain radiating to the thighs and knees, worsened by any movement, improved with rest, associated with numbness at the same site, no focal weakness, no urinary or stool incontinence. no recent fall or trauma.       In the ED was given percocet, morphine, methocarbamol and Toradol, admitted for pain control (24 Aug 2020 23:41)    PAST MEDICAL & SURGICAL HISTORY  PAST MEDICAL & SURGICAL HISTORY:  No pertinent past medical history      ALLERGIES:  No Known Allergies    MEDICATIONS:  STANDING MEDICATIONS  chlorhexidine 4% Liquid 1 Application(s) Topical <User Schedule>  enoxaparin Injectable 40 milliGRAM(s) SubCutaneous daily  gabapentin 400 milliGRAM(s) Oral three times a day  methocarbamol 750 milliGRAM(s) Oral two times a day  oxyCODONE  ER Tablet 10 milliGRAM(s) Oral every 12 hours    PRN MEDICATIONS  oxyCODONE    IR 10 milliGRAM(s) Oral every 4 hours PRN    VITALS:   T(F): 96.5  HR: 51  BP: 174/74  RR: 18  SpO2: 98%    PHYSICAL EXAM:  General: Patient in distress from back pain. Pain improved on meds.    HEENT: Pupils equal, reactive to light symmetrically.    PULM: Clear to auscultation bilaterally, no significant sputum production.    CVS: Regular rate and rhythm, no murmurs, rubs, or gallops.    Abdomen: Soft, nondistended, nontender.    Extremities: No edema, nontender.    SKIN: Warm and well perfused, no rashes noted.      NEURO: Straight leg raise test: Shooting pain and numbness on the right side.               Sensation: Numbness over the RLE.               Gait: Antalgic gait.      LABS:                        13.3   6.29  )-----------( 189      ( 25 Aug 2020 07:28 )             40.9     08-25    143  |  106  |  17  ----------------------------<  84  5.0   |  26  |  0.9    Ca    9.1      25 Aug 2020 07:28  Mg     2.1     08-25    TPro  6.1  /  Alb  3.9  /  TBili  0.7  /  DBili  x   /  AST  51<H>  /  ALT  43<H>  /  AlkPhos  72  08-25                  RADIOLOGY:      Assessment/Plan:  54 y/o female no PMH, PSH of bariatric surgery c/o acute onset rt lower back painx 1 day.  Pt started home exercising 3 weeks, since then she had back pain radiating to the thighs and knees, worsened by any movement, improved with rest, associated with numbness at the same site, no focal weakness, no urinary or stool incontinence. No recent fall or trauma.       # Lower back, thigh and knee pain with paresthesia, suspected lumbar radiculopathy   - Postive straight leg raise test; Numbness over the RLE. Severe back pain at rest.  - Pain medicine recs:  ·	Tylenol PO 650mg q6; Methocarbamol PO 750mg q6 x3 days  ·	Start Gabapentin PO 200mg q8  ·	Warm Compresses to right buttocks PRN;   - Lumbosacral MRI w/out contrast (08/27): L4-L5 N impingement; f/u NeuroSx c/s.  - f/u PT eval for Low back Pain.       Provider Handoff: f/u NeuroSx - f/u PT eval.   Activity as tolerated   Diet regular  DVT PPX Lovenox   GI ppx not indicated   Dispo from home   Code full LENGTH OF HOSPITAL STAY: 3d      CHIEF COMPLAINT:   Patient is a 55y old  Female who presents with a chief complaint of lower back pain (26 Aug 2020 07:03)      OVER Past 24hrs:  The patient was seen and examined at bedside there were no acute events overnight.     HISTORY OF PRESENTING ILLNESS:    HPI:  54 y/o female no PMH, PSH of bariatric surgery c/o acute onset rt lower back painx 1 day.  Pt started home exercising 3 weeks, since then she had back pain radiating to the thighs and knees, worsened by any movement, improved with rest, associated with numbness at the same site, no focal weakness, no urinary or stool incontinence. no recent fall or trauma.       In the ED was given percocet, morphine, methocarbamol and Toradol, admitted for pain control (24 Aug 2020 23:41)    PAST MEDICAL & SURGICAL HISTORY  PAST MEDICAL & SURGICAL HISTORY:  No pertinent past medical history      ALLERGIES:  No Known Allergies    MEDICATIONS:  STANDING MEDICATIONS  chlorhexidine 4% Liquid 1 Application(s) Topical <User Schedule>  enoxaparin Injectable 40 milliGRAM(s) SubCutaneous daily  gabapentin 400 milliGRAM(s) Oral three times a day  methocarbamol 750 milliGRAM(s) Oral two times a day  oxyCODONE  ER Tablet 10 milliGRAM(s) Oral every 12 hours    PRN MEDICATIONS  oxyCODONE    IR 10 milliGRAM(s) Oral every 4 hours PRN    VITALS:   T(F): 96.5  HR: 51  BP: 174/74  RR: 18  SpO2: 98%    PHYSICAL EXAM:  General: Patient in distress from back pain. Pain improved on meds.    HEENT: Pupils equal, reactive to light symmetrically.    PULM: Clear to auscultation bilaterally, no significant sputum production.    CVS: Regular rate and rhythm, no murmurs, rubs, or gallops.    Abdomen: Soft, nondistended, nontender.    Extremities: No edema, nontender.    SKIN: Warm and well perfused, no rashes noted.      NEURO: Straight leg raise test: Shooting pain and numbness on the right side.               Sensation: Numbness over the RLE.               Gait: Antalgic gait.      LABS:                        13.3   6.29  )-----------( 189      ( 25 Aug 2020 07:28 )             40.9     08-25    143  |  106  |  17  ----------------------------<  84  5.0   |  26  |  0.9    Ca    9.1      25 Aug 2020 07:28  Mg     2.1     08-25    TPro  6.1  /  Alb  3.9  /  TBili  0.7  /  DBili  x   /  AST  51<H>  /  ALT  43<H>  /  AlkPhos  72  08-25      RADIOLOGY:  < from: MR Lumbar Spine No Cont (08.26.20 @ 18:16) >  IMPRESSION:    1.  L4-5 moderate right foraminal disc herniation impinging the exiting right L4 nerve.    2.  Underlying L4-5 disc bulge and facet arthropathy with moderate/severe right foraminal stenosis and mild left foraminal stenosis.    3.  Additional mild degenerative changes as described.    < end of copied text >      Assessment/Plan:  54 y/o female no PMH, PSH of bariatric surgery c/o acute onset rt lower back painx 1 day.  Pt started home exercising 3 weeks, since then she had back pain radiating to the thighs and knees, worsened by any movement, improved with rest, associated with numbness at the same site, no focal weakness, no urinary or stool incontinence. No recent fall or trauma.       # Lower back, thigh and knee pain with paresthesia, suspected lumbar radiculopathy   - Postive straight leg raise test; Numbness over the RLE. Severe back pain at rest.  - Pain medicine recs:  ·	Tylenol PO 650mg q6; Methocarbamol PO 750mg q6 x3 days  ·	Start Gabapentin PO 200mg q8  ·	Warm Compresses to right buttocks PRN;   - Lumbosacral MRI w/out contrast (08/27): L4-L5 N impingement;   - NeuroSx recs:  ·	Start Decadron 10 mg then 4mgQ6; Increase Robaxin 750 mg po Q8 PRNc/w Neurontin;   ·	SI injection tomorrow; 8/28/20  ·	Continue PT/OOB ambulating; OOB to chair with all meals  - f/u PT eval for Low back Pain.       Provider Handoff: f/u NeuroSx - f/u PT eval.   Activity as tolerated   Diet regular  DVT PPX Lovenox   GI ppx not indicated   Dispo from home   Code full

## 2020-08-27 NOTE — CONSULT NOTE ADULT - ASSESSMENT
56 yo female with LBP and RLE symptoms secondary to L4-5 disc herniation causing foraminal narrowing      - Decadron 10 mg then 4mgQ6    - Continue Neurontin     - Increase Robaxin 750 mg po Q8 prn for spasm    - Dr Bray spoke with Dr Seay for SI injection tomorrow; 8/28/20       Hold any anticoagulants     - Continue PT/OOB ambulating    - OOB to chair with al meals    - Will continue to follow     Above plan per attending

## 2020-08-28 ENCOUNTER — TRANSCRIPTION ENCOUNTER (OUTPATIENT)
Age: 55
End: 2020-08-28

## 2020-08-28 VITALS
TEMPERATURE: 98 F | HEART RATE: 65 BPM | RESPIRATION RATE: 18 BRPM | DIASTOLIC BLOOD PRESSURE: 72 MMHG | SYSTOLIC BLOOD PRESSURE: 138 MMHG

## 2020-08-28 PROCEDURE — 99239 HOSP IP/OBS DSCHRG MGMT >30: CPT

## 2020-08-28 RX ORDER — GABAPENTIN 400 MG/1
1 CAPSULE ORAL
Qty: 42 | Refills: 0
Start: 2020-08-28 | End: 2020-09-10

## 2020-08-28 RX ORDER — POLYETHYLENE GLYCOL 3350 17 G/17G
17 POWDER, FOR SOLUTION ORAL
Qty: 170 | Refills: 0
Start: 2020-08-28

## 2020-08-28 RX ORDER — METHOCARBAMOL 500 MG/1
1 TABLET, FILM COATED ORAL
Qty: 42 | Refills: 0
Start: 2020-08-28 | End: 2020-09-10

## 2020-08-28 RX ORDER — OXYCODONE HYDROCHLORIDE 5 MG/1
1 TABLET ORAL
Qty: 16 | Refills: 0
Start: 2020-08-28

## 2020-08-28 RX ADMIN — OXYCODONE HYDROCHLORIDE 10 MILLIGRAM(S): 5 TABLET ORAL at 05:43

## 2020-08-28 RX ADMIN — CHLORHEXIDINE GLUCONATE 1 APPLICATION(S): 213 SOLUTION TOPICAL at 05:09

## 2020-08-28 RX ADMIN — OXYCODONE HYDROCHLORIDE 10 MILLIGRAM(S): 5 TABLET ORAL at 17:04

## 2020-08-28 RX ADMIN — Medication 4 MILLIGRAM(S): at 17:04

## 2020-08-28 RX ADMIN — OXYCODONE HYDROCHLORIDE 10 MILLIGRAM(S): 5 TABLET ORAL at 17:34

## 2020-08-28 RX ADMIN — GABAPENTIN 400 MILLIGRAM(S): 400 CAPSULE ORAL at 14:28

## 2020-08-28 RX ADMIN — OXYCODONE HYDROCHLORIDE 10 MILLIGRAM(S): 5 TABLET ORAL at 05:13

## 2020-08-28 RX ADMIN — Medication 4 MILLIGRAM(S): at 10:38

## 2020-08-28 RX ADMIN — Medication 4 MILLIGRAM(S): at 04:11

## 2020-08-28 RX ADMIN — GABAPENTIN 400 MILLIGRAM(S): 400 CAPSULE ORAL at 05:10

## 2020-08-28 NOTE — DISCHARGE NOTE NURSING/CASE MANAGEMENT/SOCIAL WORK - PATIENT PORTAL LINK FT
You can access the FollowMyHealth Patient Portal offered by [facility] by registering at the following website: [email or web address]. By joining [facility]’s FollowMyHealth portal, you will also be able to view your health information using other applications (apps) compatible with our system.

## 2020-08-28 NOTE — CHART NOTE - NSCHARTNOTEFT_GEN_A_CORE
Registered Dietitian Follow-Up     Patient Profile Reviewed                           Yes [x]   No []     Nutrition History Previously Obtained        Yes [x]  No []       Pertinent Subjective Information: Pt reports better appetite today. Ate 100% of breakfast today.      Pertinent Medical Interventions:  Lower back, thigh and knee pain with paresthesia, suspected lumbar radiculopathy. per neurosx, SI injection today. PT following.      Diet order: Regular      Anthropometrics:  - Ht. 165.1 cm  - Wt. 83.4 kg (8/24) vs 87.7 kg (8/27) ? bed scale error   - %wt change  - BMI 30.6  - IBW     Pertinent Lab Data: (8/27) glucose 104, Alk phos 120       Pertinent Meds: decadron     Physical Findings:  - Appearance: alert, no edema   - GI function: LBM 8/24 per EMR   - Tubes:  - Oral/Mouth cavity: No issues   - Skin: intact     Nutrition Requirements  Weight Used: 83.4 kg, cont from RD initial note      calories: 1433 - 1576 kcals/day (MSJ x 1.0 - 1.1 AF obesity)  protein: 66 - 83 gms/day (0.8 - 1.0 gm/kg CBW for obesity)  fluid: 1ml/kcal or per LIP     Nutrient Intake: meeting needs        [] Previous Nutrition Diagnosis:  Inadequate Oral Intake.            [] Ongoing          [x] Resolved    [] No active nutrition diagnosis identified at this time       Nutrition Intervention meal/snack,. vitamin/mineral      Goal/Expected Outcome: pt to consume >75% of meal tray in 7 days      Indicator/Monitoring: RD to monitor energy intake, diet order, body comp, NFPF    Recommendation:  - Continue Regular diet  - Start MVI w/ minerals daily

## 2020-08-28 NOTE — DISCHARGE NOTE PROVIDER - NSDCMRMEDTOKEN_GEN_ALL_CORE_FT
gabapentin 400 mg oral capsule: 1 cap(s) orally 3 times a day  Medrol Dosepak 4 mg oral tablet: 6 tab(s) orally once a day for 1 day then  5 tabs once a day for 1 day then  4 tabs once a day for 1 day then  3 tabs once a day for 1 day then  2 tabs once a day for 1 day then  1 tab once a day then stop  methocarbamol 750 mg oral tablet: 1 tab(s) orally every 8 hours, As needed, Low back pain - Muscle spasm  MiraLax oral powder for reconstitution: 17 gram(s) orally 2 times a day   oxyCODONE 10 mg oral tablet: 1 tab(s) orally every 6 hours MDD:4 tabs

## 2020-08-28 NOTE — CONSULT NOTE ADULT - SUBJECTIVE AND OBJECTIVE BOX
INTERVENTIONAL RADIOLOGY CONSULT:     Procedure Requested: Epidural steroid injection    HPI:  56 y/o female no PMH, PSH of bariatric surgery c/o acute onset rt lower back painx 1 day.  Pt started home exercising 3 weeks, since then she had back pain radiating to the thighs and knees, worsened by any movement, improved with rest, associated with numbness at the same site, no focal weakness, no urinary or stool incontinence. no recent fall or trauma.       In the ED was given percocet, morphine, methocarbamol and Toradol, admitted for pain control (24 Aug 2020 23:41)      PAST MEDICAL & SURGICAL HISTORY:  No pertinent past medical history      MEDICATIONS  (STANDING):  betamethasone Injectable 30 milliGRAM(s) IntraMuscular once  chlorhexidine 4% Liquid 1 Application(s) Topical <User Schedule>  dexAMETHasone     Tablet 4 milliGRAM(s) Oral every 6 hours  gabapentin 400 milliGRAM(s) Oral three times a day  oxyCODONE  ER Tablet 10 milliGRAM(s) Oral every 12 hours    MEDICATIONS  (PRN):  methocarbamol 750 milliGRAM(s) Oral every 8 hours PRN Low back pain - Muscle spasm  oxyCODONE    IR 10 milliGRAM(s) Oral every 4 hours PRN Severe Pain (7 - 10)      Allergies    No Known Allergies    Intolerances        Social History:   Smoking: Yes [ ]  No [ ]   ______pk yrs  ETOH  Yes [ ]  No [ ]  Social [ ]  DRUGS:  Yes [ ]  No [ ]  if so what______________    FAMILY HISTORY:      Physical Exam:   Vital Signs Last 24 Hrs  T(C): 36.9 (28 Aug 2020 14:38), Max: 36.9 (27 Aug 2020 19:30)  T(F): 98.4 (28 Aug 2020 14:38), Max: 98.5 (27 Aug 2020 21:00)  HR: 65 (28 Aug 2020 14:38) (61 - 65)  BP: 138/72 (28 Aug 2020 14:38) (130/76 - 139/71)  BP(mean): --  RR: 18 (28 Aug 2020 14:38) (18 - 18)  SpO2: 98% (28 Aug 2020 07:32) (97% - 98%)        Labs:     08-27    139  |  101  |  11  ----------------------------<  104<H>  4.2   |  27  |  0.9    Ca    8.8      27 Aug 2020 12:51    TPro  6.3  /  Alb  4.0  /  TBili  0.3  /  DBili  x   /  AST  49<H>  /  ALT  91<H>  /  AlkPhos  120<H>  08-27        Pertinent labs:    08-27    139  |  101  |  11  ----------------------------<  104<H>  4.2   |  27  |  0.9    Ca    8.8      27 Aug 2020 12:51    TPro  6.3  /  Alb  4.0  /  TBili  0.3  /  DBili  x   /  AST  49<H>  /  ALT  91<H>  /  AlkPhos  120<H>  08-27          Radiology & Additional Studies:     Radiology imaging reviewed.       ASSESSMENT/ PLAN:     - patient seen at bedside for consent pre-procedure  - patient describes pain has markedly improved since being on oral steroid treatment while inpatient, still with some residual numbness  - considering significant improvement with oral therapy, beneficial to continue oral steroid management for now with possible epidural injection as outpatient at a later time  - patient agrees with plan  - call back for any ?'s    Risks, benefits, and alternatives to treatment discussed. All questions answered with understanding.    Thank you for the courtesy of this consult, please call [phone] with any further questions.

## 2020-08-28 NOTE — PROGRESS NOTE ADULT - SUBJECTIVE AND OBJECTIVE BOX
Discharge note    DWAYNEDAVETJ  55y Female    INTERVAL HPI/OVERNIGHT EVENTS:    Pt feels better on decadron now.  less right buttock pain and she is ambulating better.  Wants to go home after ARIELLA today.    T(F): 97.1 (08-28-20 @ 05:28), Max: 98.5 (08-27-20 @ 21:00)  HR: 61 (08-28-20 @ 05:28) (61 - 64)  BP: 139/71 (08-28-20 @ 05:28) (130/76 - 139/71)  RR: 18 (08-28-20 @ 05:28) (18 - 20)  SpO2: 98% (08-28-20 @ 07:32) (97% - 98%)    PHYSICAL EXAM:  GENERAL: NAD  HEAD:  Normocephalic  EYES:  conjunctiva and sclera clear  ENMT: Moist mucous membranes  NECK: Supple  NERVOUS SYSTEM:  Alert & Oriented X3, Good concentration, improved gait today  CHEST/LUNG: Clear to percussion bilaterally; No rales, rhonchi, wheezing  HEART: Regular rate and rhythm; No murmurs  ABDOMEN: Soft, Nontender, Nondistended  EXTREMITIES:   No edema  SKIN: No rashes     Consultant(s) Notes Reviewed:  [x ] YES  [ ] NO  Care Discussed with Consultants/Other Providers [ x] YES  [ ] NO    MEDICATIONS  (STANDING):  chlorhexidine 4% Liquid 1 Application(s) Topical <User Schedule>  dexAMETHasone     Tablet 4 milliGRAM(s) Oral every 6 hours  gabapentin 400 milliGRAM(s) Oral three times a day  oxyCODONE  ER Tablet 10 milliGRAM(s) Oral every 12 hours    MEDICATIONS  (PRN):  methocarbamol 750 milliGRAM(s) Oral every 8 hours PRN Low back pain - Muscle spasm  oxyCODONE    IR 10 milliGRAM(s) Oral every 4 hours PRN Severe Pain (7 - 10)      LABS:    08-27    139  |  101  |  11  ----------------------------<  104<H>  4.2   |  27  |  0.9    Ca    8.8      27 Aug 2020 12:51    TPro  6.3  /  Alb  4.0  /  TBili  0.3  /  DBili  x   /  AST  49<H>  /  ALT  91<H>  /  AlkPhos  120<H>  08-27          RADIOLOGY & ADDITIONAL TESTS:    Imaging or report Personally Reviewed:  [x ] YES  [ ] NO    < from: MR Lumbar Spine No Cont (08.26.20 @ 18:16) >  IMPRESSION:    1.  L4-5 moderate right foraminal disc herniation impinging the exiting right L4 nerve.    2.  Underlying L4-5 disc bulge and facet arthropathy with moderate/severe right foraminal stenosis and mild left foraminal stenosis.    3.  Additional mild degenerative changes as described.      < end of copied text >      Case discussed with resident    Care discussed with pt

## 2020-08-28 NOTE — DISCHARGE NOTE PROVIDER - NSDCCPCAREPLAN_GEN_ALL_CORE_FT
PRINCIPAL DISCHARGE DIAGNOSIS  Diagnosis: Lumbar disc herniation  Assessment and Plan of Treatment: You developed back pain from L4-L5 disc herniation with resulting L4 radiculopathy (pain and numbness). You were evaluated by pain management and neurosurgery and will be treated with medical therapy.  Take the medications as prescribed.  follow up with Dr. Bray and pain management on discharge.  Take miralax for constipation.  Return to the emergency room for worsening or new symptoms.

## 2020-08-28 NOTE — DISCHARGE NOTE PROVIDER - CARE PROVIDER_API CALL
Stephanie Bray)  [facility]  [address] [place] [address], [address]  [place] [address]  Phone: [phone]  Fax: [phone]  Follow Up Time: 2 weeks    ELEANOR IVERSON  Anesthesiology  [address] [place] [address]  Phone: [phone]  Fax: [phone]  Follow Up Time: 1 week

## 2020-08-28 NOTE — CHART NOTE - NSCHARTNOTEFT_GEN_A_CORE
56 yo female presented to the hospital with LBP radiating to the RIGHT leg. After being seen by Neurosurgery, recommendations were made to include steroids as well as an ARIELLA by Dr Seay.    As per conversation with attending, Dr Bray, prior to undergoing the injection, patient endorsed improvement in her symptoms with medications. Given that, the ARIELLA was not performed.    From a Neurosurgical standpoint, the patient can be discharged home with the following;      - Gabapentin    - Medrol dose pack    - Muscle relaxants    - pain medication      Patient to follow-up with Dr Carvajal (Pain management) as an outpatient    Above plan per attending, Dr Bray

## 2020-08-28 NOTE — DISCHARGE NOTE PROVIDER - CARE PROVIDERS DIRECT ADDRESSES
[email or web address]

## 2020-08-28 NOTE — PROGRESS NOTE ADULT - ASSESSMENT
56 y/o woman with PSH of bariatric surgery presented with acute onset of right lower back pain/buttock pain x 1 week after doing exercises at home. She was diagnosed with L4-L5 disc herniation and L4 radiculopathy.  Pain is now better controlled after addition of decadron. She was evaluated by NS and pain management.  She wants to continue conservative therapy for now.  She is scheduled for ARIELLA today.  She wants to go home after ARIELLA.  Will likely discharge home on medrol dose pack, robaxin, gabapentin and oxycodone IR 10mg q6hr prn severe pain.  She will outpt f/u with NS and pain management  Add miralax for constipation  Will review medication reconciliation when completed. 56 y/o woman with PSH of bariatric surgery presented with acute onset of right lower back pain/buttock pain x 1 week after doing exercises at home. She was diagnosed with L4-L5 disc herniation and L4 radiculopathy.  Pain is now better controlled after addition of decadron. She was evaluated by NS and pain management.  She wants to continue conservative therapy for now.  She is scheduled for ARIELLA today.  She wants to go home after ARIELLA.  Will likely discharge home on medrol dose pack, robaxin, gabapentin and oxycodone IR 10mg q6hr prn severe pain.  She will f/u with NS and pain management as outpt.  Add miralax for constipation.    Addendum:  ARIELLA will not be done at this time since pt improved on decadron per NS.  She will be discharged home on meds as above.    Medication reconciliation and discharge papers completed by me.    Spent 40 minutes on the discharge process of this pt.

## 2020-08-28 NOTE — DISCHARGE NOTE PROVIDER - PROVIDER TOKENS
PROVIDER:[TOKEN:[[id number]],FOLLOWUP:[2 weeks]],PROVIDER:[TOKEN:[[id number]],FOLLOWUP:[1 week]]

## 2020-08-28 NOTE — DISCHARGE NOTE PROVIDER - HOSPITAL COURSE
54 y/o woman with PSH of bariatric surgery presented with acute onset of right lower back pain/buttock pain x 1 week after doing exercises at home. She was diagnosed with L4-L5 disc herniation on MRI and L4 radiculopathy.    Pain is now better controlled after addition of decadron. She was evaluated by NS and pain management.    She wants to continue conservative therapy for now.    She was scheduled for ARIELLA today but it will be canceled since she is improving on steroids.    She wants to go home today.    Will discharge home on medrol dose pack, robaxin, gabapentin and oxycodone IR 10mg q6hr prn severe pain.    She will f/u with NS and pain management as outpt    Miralax added for constipation.        She developed mild transaminitis and tylenol ATC was stopped.    She is asymptomatic and needs repeat Liver function test as outpt.

## 2020-09-01 DIAGNOSIS — M48.061 SPINAL STENOSIS, LUMBAR REGION WITHOUT NEUROGENIC CLAUDICATION: ICD-10-CM

## 2020-09-01 DIAGNOSIS — Z98.84 BARIATRIC SURGERY STATUS: ICD-10-CM

## 2020-09-01 DIAGNOSIS — E87.5 HYPERKALEMIA: ICD-10-CM

## 2020-09-01 DIAGNOSIS — K59.00 CONSTIPATION, UNSPECIFIED: ICD-10-CM

## 2020-09-01 DIAGNOSIS — M51.16 INTERVERTEBRAL DISC DISORDERS WITH RADICULOPATHY, LUMBAR REGION: ICD-10-CM

## 2020-09-16 ENCOUNTER — APPOINTMENT (OUTPATIENT)
Dept: NEUROSURGERY | Facility: CLINIC | Age: 55
End: 2020-09-16
Payer: COMMERCIAL

## 2020-09-16 VITALS — WEIGHT: 185 LBS | BODY MASS INDEX: 30.82 KG/M2 | HEIGHT: 65 IN

## 2020-09-16 DIAGNOSIS — M51.26 OTHER INTERVERTEBRAL DISC DISPLACEMENT, LUMBAR REGION: ICD-10-CM

## 2020-09-16 PROCEDURE — 99214 OFFICE O/P EST MOD 30 MIN: CPT

## 2020-09-17 PROBLEM — M51.26 LUMBAR DISC HERNIATION: Status: ACTIVE | Noted: 2020-09-17

## 2020-09-17 NOTE — HISTORY OF PRESENT ILLNESS
[FreeTextEntry1] : Mrs. Nettles presents today in follow up after being admitted on August 28, 2020 for right sided anterior leg pain after receiving a massage from a friend. It is associated with numbess and a sensation of weakness. She received a steroids in the hospital and slowly improved. She is scheduled to see pain management on Friday. \par \par MRI of the lumbar spine w/o contrast done on 8/26 showed L4-5 right foraminal disc herniation impinging the exiting right L4 nerve

## 2021-07-17 ENCOUNTER — TRANSCRIPTION ENCOUNTER (OUTPATIENT)
Age: 56
End: 2021-07-17

## 2021-07-21 ENCOUNTER — TRANSCRIPTION ENCOUNTER (OUTPATIENT)
Age: 56
End: 2021-07-21

## 2021-08-16 ENCOUNTER — TRANSCRIPTION ENCOUNTER (OUTPATIENT)
Age: 56
End: 2021-08-16

## 2021-09-06 ENCOUNTER — TRANSCRIPTION ENCOUNTER (OUTPATIENT)
Age: 56
End: 2021-09-06

## 2021-09-17 ENCOUNTER — TRANSCRIPTION ENCOUNTER (OUTPATIENT)
Age: 56
End: 2021-09-17

## 2021-09-23 ENCOUNTER — TRANSCRIPTION ENCOUNTER (OUTPATIENT)
Age: 56
End: 2021-09-23

## 2022-05-13 ENCOUNTER — EMERGENCY (EMERGENCY)
Facility: HOSPITAL | Age: 57
LOS: 0 days | Discharge: HOME | End: 2022-05-13
Attending: EMERGENCY MEDICINE | Admitting: EMERGENCY MEDICINE
Payer: COMMERCIAL

## 2022-05-13 VITALS
SYSTOLIC BLOOD PRESSURE: 139 MMHG | TEMPERATURE: 98 F | DIASTOLIC BLOOD PRESSURE: 76 MMHG | RESPIRATION RATE: 19 BRPM | HEART RATE: 53 BPM | OXYGEN SATURATION: 100 %

## 2022-05-13 VITALS
RESPIRATION RATE: 19 BRPM | TEMPERATURE: 98 F | DIASTOLIC BLOOD PRESSURE: 80 MMHG | OXYGEN SATURATION: 100 % | SYSTOLIC BLOOD PRESSURE: 145 MMHG | HEART RATE: 65 BPM | HEIGHT: 65 IN

## 2022-05-13 DIAGNOSIS — Z90.3 ACQUIRED ABSENCE OF STOMACH [PART OF]: Chronic | ICD-10-CM

## 2022-05-13 DIAGNOSIS — Z98.84 BARIATRIC SURGERY STATUS: ICD-10-CM

## 2022-05-13 DIAGNOSIS — R07.89 OTHER CHEST PAIN: ICD-10-CM

## 2022-05-13 DIAGNOSIS — R42 DIZZINESS AND GIDDINESS: ICD-10-CM

## 2022-05-13 DIAGNOSIS — Z98.891 HISTORY OF UTERINE SCAR FROM PREVIOUS SURGERY: Chronic | ICD-10-CM

## 2022-05-13 DIAGNOSIS — R00.1 BRADYCARDIA, UNSPECIFIED: ICD-10-CM

## 2022-05-13 DIAGNOSIS — R00.2 PALPITATIONS: ICD-10-CM

## 2022-05-13 DIAGNOSIS — Z85.41 PERSONAL HISTORY OF MALIGNANT NEOPLASM OF CERVIX UTERI: ICD-10-CM

## 2022-05-13 DIAGNOSIS — Z20.822 CONTACT WITH AND (SUSPECTED) EXPOSURE TO COVID-19: ICD-10-CM

## 2022-05-13 DIAGNOSIS — Z87.39 PERSONAL HISTORY OF OTHER DISEASES OF THE MUSCULOSKELETAL SYSTEM AND CONNECTIVE TISSUE: ICD-10-CM

## 2022-05-13 LAB
ALBUMIN SERPL ELPH-MCNC: 4.3 G/DL — SIGNIFICANT CHANGE UP (ref 3.5–5.2)
ALP SERPL-CCNC: 54 U/L — SIGNIFICANT CHANGE UP (ref 30–115)
ALT FLD-CCNC: 19 U/L — SIGNIFICANT CHANGE UP (ref 0–41)
ANION GAP SERPL CALC-SCNC: 13 MMOL/L — SIGNIFICANT CHANGE UP (ref 7–14)
APTT BLD: 31 SEC — SIGNIFICANT CHANGE UP (ref 27–39.2)
AST SERPL-CCNC: 21 U/L — SIGNIFICANT CHANGE UP (ref 0–41)
BASOPHILS # BLD AUTO: 0.02 K/UL — SIGNIFICANT CHANGE UP (ref 0–0.2)
BASOPHILS NFR BLD AUTO: 0.5 % — SIGNIFICANT CHANGE UP (ref 0–1)
BILIRUB SERPL-MCNC: 0.6 MG/DL — SIGNIFICANT CHANGE UP (ref 0.2–1.2)
BUN SERPL-MCNC: 15 MG/DL — SIGNIFICANT CHANGE UP (ref 10–20)
CALCIUM SERPL-MCNC: 9.2 MG/DL — SIGNIFICANT CHANGE UP (ref 8.5–10.1)
CHLORIDE SERPL-SCNC: 106 MMOL/L — SIGNIFICANT CHANGE UP (ref 98–110)
CO2 SERPL-SCNC: 24 MMOL/L — SIGNIFICANT CHANGE UP (ref 17–32)
CREAT SERPL-MCNC: 0.8 MG/DL — SIGNIFICANT CHANGE UP (ref 0.7–1.5)
D DIMER BLD IA.RAPID-MCNC: 384 NG/ML DDU — HIGH (ref 0–230)
EGFR: 86 ML/MIN/1.73M2 — SIGNIFICANT CHANGE UP
EOSINOPHIL # BLD AUTO: 0.02 K/UL — SIGNIFICANT CHANGE UP (ref 0–0.7)
EOSINOPHIL NFR BLD AUTO: 0.5 % — SIGNIFICANT CHANGE UP (ref 0–8)
GLUCOSE SERPL-MCNC: 87 MG/DL — SIGNIFICANT CHANGE UP (ref 70–99)
HCT VFR BLD CALC: 41.7 % — SIGNIFICANT CHANGE UP (ref 37–47)
HGB BLD-MCNC: 14.1 G/DL — SIGNIFICANT CHANGE UP (ref 12–16)
IMM GRANULOCYTES NFR BLD AUTO: 0.2 % — SIGNIFICANT CHANGE UP (ref 0.1–0.3)
INR BLD: 0.91 RATIO — SIGNIFICANT CHANGE UP (ref 0.65–1.3)
LYMPHOCYTES # BLD AUTO: 1.47 K/UL — SIGNIFICANT CHANGE UP (ref 1.2–3.4)
LYMPHOCYTES # BLD AUTO: 35.8 % — SIGNIFICANT CHANGE UP (ref 20.5–51.1)
MAGNESIUM SERPL-MCNC: 2 MG/DL — SIGNIFICANT CHANGE UP (ref 1.8–2.4)
MCHC RBC-ENTMCNC: 31.5 PG — HIGH (ref 27–31)
MCHC RBC-ENTMCNC: 33.8 G/DL — SIGNIFICANT CHANGE UP (ref 32–37)
MCV RBC AUTO: 93.1 FL — SIGNIFICANT CHANGE UP (ref 81–99)
MONOCYTES # BLD AUTO: 0.29 K/UL — SIGNIFICANT CHANGE UP (ref 0.1–0.6)
MONOCYTES NFR BLD AUTO: 7.1 % — SIGNIFICANT CHANGE UP (ref 1.7–9.3)
NEUTROPHILS # BLD AUTO: 2.3 K/UL — SIGNIFICANT CHANGE UP (ref 1.4–6.5)
NEUTROPHILS NFR BLD AUTO: 55.9 % — SIGNIFICANT CHANGE UP (ref 42.2–75.2)
NRBC # BLD: 0 /100 WBCS — SIGNIFICANT CHANGE UP (ref 0–0)
NT-PROBNP SERPL-SCNC: 141 PG/ML — SIGNIFICANT CHANGE UP (ref 0–300)
PLATELET # BLD AUTO: 184 K/UL — SIGNIFICANT CHANGE UP (ref 130–400)
POTASSIUM SERPL-MCNC: 4.4 MMOL/L — SIGNIFICANT CHANGE UP (ref 3.5–5)
POTASSIUM SERPL-SCNC: 4.4 MMOL/L — SIGNIFICANT CHANGE UP (ref 3.5–5)
PROT SERPL-MCNC: 7 G/DL — SIGNIFICANT CHANGE UP (ref 6–8)
PROTHROM AB SERPL-ACNC: 10.5 SEC — SIGNIFICANT CHANGE UP (ref 9.95–12.87)
RBC # BLD: 4.48 M/UL — SIGNIFICANT CHANGE UP (ref 4.2–5.4)
RBC # FLD: 13.2 % — SIGNIFICANT CHANGE UP (ref 11.5–14.5)
SARS-COV-2 RNA SPEC QL NAA+PROBE: SIGNIFICANT CHANGE UP
SODIUM SERPL-SCNC: 143 MMOL/L — SIGNIFICANT CHANGE UP (ref 135–146)
TROPONIN T SERPL-MCNC: <0.01 NG/ML — SIGNIFICANT CHANGE UP
TROPONIN T SERPL-MCNC: <0.01 NG/ML — SIGNIFICANT CHANGE UP
WBC # BLD: 4.11 K/UL — LOW (ref 4.8–10.8)
WBC # FLD AUTO: 4.11 K/UL — LOW (ref 4.8–10.8)

## 2022-05-13 PROCEDURE — 71046 X-RAY EXAM CHEST 2 VIEWS: CPT | Mod: 26

## 2022-05-13 PROCEDURE — 93010 ELECTROCARDIOGRAM REPORT: CPT

## 2022-05-13 PROCEDURE — 99285 EMERGENCY DEPT VISIT HI MDM: CPT

## 2022-05-13 PROCEDURE — 71275 CT ANGIOGRAPHY CHEST: CPT | Mod: 26,MA

## 2022-05-13 NOTE — ED PROVIDER NOTE - NS ED ATTENDING STATEMENT MOD
This was a shared visit with the KURT. I reviewed and verified the documentation and independently performed the documented:

## 2022-05-13 NOTE — ED ADULT NURSE NOTE - BRAND OF COVID-19 VACCINATION
Patient states he was advised to call and let PCP know if the Strattera was helping at all. After reviewing the last OV note, patient was to return for a 1 month follow up. He states he must have misunderstood.    Patient sates that the med is not helping at this dose.  We attempted to schedule an appointment but he is leaving Saturday to go back to school. He will be back in the area around spring break and can make an appointment for then.    Asking what to do in the mean time.  Please advise   Moderna dose 1, 2, and 3

## 2022-05-13 NOTE — ED PROVIDER NOTE - NSFOLLOWUPINSTRUCTIONS_ED_ALL_ED_FT
Our Emergency Department Referral Coordinators will be reaching out ot you in the next 24-48 hours from 9:00am to 5:00pm (Monday to Friday) with a follow up appointment. Please expect a phone call from the hospital in that time frame. If you do not receive a call or if you have any questions or concerns, you can reach them at [phone] or [phone].

## 2022-05-13 NOTE — ED ADULT TRIAGE NOTE - NS ED NURSE BANDS TYPE
11/5/20 Patient: Camden Due YOB: 1978 Date of Visit: 11/2/2020 Keira Cano NP 
[address] [place] [phone] VIA Facsimile: [phone] Dear Keira Cano NP, Thank you for referring Mr. Celeste Whittington to [place] [address]. for evaluation. My notes for this consultation are attached. If you have questions, please do not hesitate to call me. I look forward to following your patient along with you.  
 
 
Sincerely, 
 
Natasha Telles, DO 
 

NOTIFICATION RETURN TO WORK / SCHOOL 
 
11/2/2020 4:13 PM 
 
Mr. Jesus Beverly [address] [facility] [phone] To Whom It May Concern: 
 
Jesus Beverly is currently under the care of [address] [place] [address]. He will return to work/school on: 11/3/2020 No lift carry more than 25#. This will be a permanent limitations. If there are questions or concerns please have the patient contact our office.  
 
 
 
Sincerely, 
 
 
Marny Sandifer, DO 
 
                                
 

Name band;

## 2022-05-13 NOTE — ED ADULT NURSE NOTE - INTERVENTIONS DEFINITIONS
[place] to call system/Call bell, personal items and telephone within reach/Instruct patient to call for assistance/Room bathroom lighting operational/Non-slip footwear when patient is off stretcher/Physically safe environment: no spills, clutter or unnecessary equipment/Stretcher in lowest position, wheels locked, appropriate side rails in place/Provide visual cue, wrist band, yellow gown, etc./Monitor gait and stability

## 2022-05-13 NOTE — ED PROVIDER NOTE - ATTENDING APP SHARED VISIT CONTRIBUTION OF CARE
56-year-old female, past medical history of chronic back pain, comes in complaining of intermittent palpitations for the last 3 days.  Episodes last a few seconds.  Patient reports mild lightheadedness right after.  No fever/chills, chest pain, shortness of breath, nausea/vomiting/diarrhea, abdominal pain, leg swelling.  No travel.  Patient is on estrogen.  On exam, pt in NAD, AAOx3, head NC/AT, CN II-XII intact, lungs CTA B/L, CV S1S2 regular, abdomen soft/NT/ND/(+)BS, ext (-) edema, motor 5/5x4, sensation intact.  Labs, x-ray, CT done and reviewed.  Will DC patient with cardiology follow-up.  Advised to return for worsening of symptoms.

## 2022-05-13 NOTE — ED PROVIDER NOTE - PROGRESS NOTE DETAILS
PP: 56Y F with no sig PMH, on estrogen for hormone replacement, L4/L5 disc herniation, presents with CC of palpitations (describes it as "fluttery" feeling) for the past 3 days. Patient reports that she has been having these symptoms, intermittently, no inciting factors, lasts for a few seconds, associated with lightheadedness, feeling faint, and SOB, but no chest pain associated with it, no LOC episodes. Denies fevers, URI symptoms, chest pain, abdominal pain, N/V/D, LE swelling, recent immobilizations.   Exam:   VITALS: Reviewed  CONSTITUTIONAL: well developed, well nourished, in no acute distress, speaking in full sentences, nontoxic appearing  SKIN: warm, dry, no rash  HEAD: normocephalic, atraumatic  EYES: PERRL, EOMI, no conjunctival erythema, sclera clear  ENT: patent airway, moist mucous membranes  NECK: supple, no masses  CV:  regular rate, regular rhythm, 2+ radial pulses bilaterally  RESP: no wheezes, no rales, no rhonchi, normal work of breathing  ABD: soft, nontender, nondistended, no rebound, no guarding  MSK: normal ROM, no cyanosis, no edema  NEURO: alert, oriented x3  PSYCH: cooperative, appropriate    Assessment: Palpitations, on estrogen, R/O PE  Plan: Labs, CXR, Dimer elevated, CTA PE study REPEAT TROP NEG, WILL DC WITH CARDS F/U

## 2022-05-13 NOTE — ED PROVIDER NOTE - PATIENT PORTAL LINK FT
You can access the FollowMyHealth Patient Portal offered by [facility] by registering at the following website: [email or web address]. By joining [facility]’s FollowMyHealth portal, you will also be able to view your health information using other applications (apps) compatible with our system.

## 2022-05-13 NOTE — ED PROVIDER NOTE - OBJECTIVE STATEMENT
Pt is a 55y/o female with a pmhx of L4/L5 DISC herniation here for eval of "chest fluttering" intermittently x 3 days, lasting for seconds followed by feelings of lightheadedness. Pt denies fever, chills, CP, SOB, N/V/D, Abd pain, leg swelling

## 2022-05-13 NOTE — ED PROVIDER NOTE - NS ED ROS FT
Eyes:  No visual changes, eye pain or discharge.  ENMT:  No hearing changes, pain, discharge or infections. No neck pain or stiffness.  Cardiac: + palpitations  No chest pain, SOB   Respiratory:  No cough or respiratory distress. No hemoptysis. No history of asthma or RAD.  GI:  No nausea, vomiting, diarrhea or abdominal pain.  :  No dysuria, frequency or burning.  MS:  No myalgia, muscle weakness, joint pain or back pain.  Neuro:  No headache or weakness.  No LOC.  Skin:  No skin rash.   Endocrine: No history of thyroid disease or diabetes.  Except as documented in the HPI,  all other systems are negative.

## 2022-05-19 ENCOUNTER — EMERGENCY (EMERGENCY)
Facility: HOSPITAL | Age: 57
LOS: 0 days | Discharge: HOME | End: 2022-05-20
Attending: EMERGENCY MEDICINE | Admitting: STUDENT IN AN ORGANIZED HEALTH CARE EDUCATION/TRAINING PROGRAM
Payer: COMMERCIAL

## 2022-05-19 VITALS
HEIGHT: 65 IN | DIASTOLIC BLOOD PRESSURE: 71 MMHG | RESPIRATION RATE: 20 BRPM | TEMPERATURE: 98 F | WEIGHT: 197.09 LBS | OXYGEN SATURATION: 100 % | HEART RATE: 65 BPM | SYSTOLIC BLOOD PRESSURE: 154 MMHG

## 2022-05-19 DIAGNOSIS — Z82.49 FAMILY HISTORY OF ISCHEMIC HEART DISEASE AND OTHER DISEASES OF THE CIRCULATORY SYSTEM: ICD-10-CM

## 2022-05-19 DIAGNOSIS — R07.89 OTHER CHEST PAIN: ICD-10-CM

## 2022-05-19 DIAGNOSIS — Z79.52 LONG TERM (CURRENT) USE OF SYSTEMIC STEROIDS: ICD-10-CM

## 2022-05-19 DIAGNOSIS — R00.2 PALPITATIONS: ICD-10-CM

## 2022-05-19 DIAGNOSIS — Z90.3 ACQUIRED ABSENCE OF STOMACH [PART OF]: Chronic | ICD-10-CM

## 2022-05-19 DIAGNOSIS — Z20.822 CONTACT WITH AND (SUSPECTED) EXPOSURE TO COVID-19: ICD-10-CM

## 2022-05-19 DIAGNOSIS — Z98.891 HISTORY OF UTERINE SCAR FROM PREVIOUS SURGERY: Chronic | ICD-10-CM

## 2022-05-19 DIAGNOSIS — Z85.41 PERSONAL HISTORY OF MALIGNANT NEOPLASM OF CERVIX UTERI: ICD-10-CM

## 2022-05-19 DIAGNOSIS — Z98.84 BARIATRIC SURGERY STATUS: ICD-10-CM

## 2022-05-19 PROBLEM — C53.9 MALIGNANT NEOPLASM OF CERVIX UTERI, UNSPECIFIED: Chronic | Status: ACTIVE | Noted: 2022-05-13

## 2022-05-19 LAB
ALBUMIN SERPL ELPH-MCNC: 4.4 G/DL — SIGNIFICANT CHANGE UP (ref 3.5–5.2)
ALP SERPL-CCNC: 54 U/L — SIGNIFICANT CHANGE UP (ref 30–115)
ALT FLD-CCNC: 16 U/L — SIGNIFICANT CHANGE UP (ref 0–41)
ANION GAP SERPL CALC-SCNC: 12 MMOL/L — SIGNIFICANT CHANGE UP (ref 7–14)
AST SERPL-CCNC: 18 U/L — SIGNIFICANT CHANGE UP (ref 0–41)
BASOPHILS # BLD AUTO: 0.02 K/UL — SIGNIFICANT CHANGE UP (ref 0–0.2)
BASOPHILS NFR BLD AUTO: 0.5 % — SIGNIFICANT CHANGE UP (ref 0–1)
BILIRUB SERPL-MCNC: 0.8 MG/DL — SIGNIFICANT CHANGE UP (ref 0.2–1.2)
BUN SERPL-MCNC: 14 MG/DL — SIGNIFICANT CHANGE UP (ref 10–20)
CALCIUM SERPL-MCNC: 9.5 MG/DL — SIGNIFICANT CHANGE UP (ref 8.5–10.1)
CHLORIDE SERPL-SCNC: 104 MMOL/L — SIGNIFICANT CHANGE UP (ref 98–110)
CO2 SERPL-SCNC: 24 MMOL/L — SIGNIFICANT CHANGE UP (ref 17–32)
CREAT SERPL-MCNC: 0.8 MG/DL — SIGNIFICANT CHANGE UP (ref 0.7–1.5)
EGFR: 86 ML/MIN/1.73M2 — SIGNIFICANT CHANGE UP
EOSINOPHIL # BLD AUTO: 0.01 K/UL — SIGNIFICANT CHANGE UP (ref 0–0.7)
EOSINOPHIL NFR BLD AUTO: 0.2 % — SIGNIFICANT CHANGE UP (ref 0–8)
GLUCOSE SERPL-MCNC: 93 MG/DL — SIGNIFICANT CHANGE UP (ref 70–99)
HCG SERPL QL: NEGATIVE — SIGNIFICANT CHANGE UP
HCT VFR BLD CALC: 41.5 % — SIGNIFICANT CHANGE UP (ref 37–47)
HGB BLD-MCNC: 14 G/DL — SIGNIFICANT CHANGE UP (ref 12–16)
IMM GRANULOCYTES NFR BLD AUTO: 0.2 % — SIGNIFICANT CHANGE UP (ref 0.1–0.3)
LYMPHOCYTES # BLD AUTO: 1.14 K/UL — LOW (ref 1.2–3.4)
LYMPHOCYTES # BLD AUTO: 28.3 % — SIGNIFICANT CHANGE UP (ref 20.5–51.1)
MCHC RBC-ENTMCNC: 31.7 PG — HIGH (ref 27–31)
MCHC RBC-ENTMCNC: 33.7 G/DL — SIGNIFICANT CHANGE UP (ref 32–37)
MCV RBC AUTO: 94.1 FL — SIGNIFICANT CHANGE UP (ref 81–99)
MONOCYTES # BLD AUTO: 0.31 K/UL — SIGNIFICANT CHANGE UP (ref 0.1–0.6)
MONOCYTES NFR BLD AUTO: 7.7 % — SIGNIFICANT CHANGE UP (ref 1.7–9.3)
NEUTROPHILS # BLD AUTO: 2.54 K/UL — SIGNIFICANT CHANGE UP (ref 1.4–6.5)
NEUTROPHILS NFR BLD AUTO: 63.1 % — SIGNIFICANT CHANGE UP (ref 42.2–75.2)
NRBC # BLD: 0 /100 WBCS — SIGNIFICANT CHANGE UP (ref 0–0)
NT-PROBNP SERPL-SCNC: 95 PG/ML — SIGNIFICANT CHANGE UP (ref 0–300)
PLATELET # BLD AUTO: 176 K/UL — SIGNIFICANT CHANGE UP (ref 130–400)
POTASSIUM SERPL-MCNC: 4.8 MMOL/L — SIGNIFICANT CHANGE UP (ref 3.5–5)
POTASSIUM SERPL-SCNC: 4.8 MMOL/L — SIGNIFICANT CHANGE UP (ref 3.5–5)
PROT SERPL-MCNC: 6.9 G/DL — SIGNIFICANT CHANGE UP (ref 6–8)
RBC # BLD: 4.41 M/UL — SIGNIFICANT CHANGE UP (ref 4.2–5.4)
RBC # FLD: 13.1 % — SIGNIFICANT CHANGE UP (ref 11.5–14.5)
SARS-COV-2 RNA SPEC QL NAA+PROBE: SIGNIFICANT CHANGE UP
SODIUM SERPL-SCNC: 140 MMOL/L — SIGNIFICANT CHANGE UP (ref 135–146)
TROPONIN T SERPL-MCNC: <0.01 NG/ML — SIGNIFICANT CHANGE UP
TROPONIN T SERPL-MCNC: <0.01 NG/ML — SIGNIFICANT CHANGE UP
WBC # BLD: 4.03 K/UL — LOW (ref 4.8–10.8)
WBC # FLD AUTO: 4.03 K/UL — LOW (ref 4.8–10.8)

## 2022-05-19 PROCEDURE — 71045 X-RAY EXAM CHEST 1 VIEW: CPT | Mod: 26

## 2022-05-19 PROCEDURE — 93010 ELECTROCARDIOGRAM REPORT: CPT | Mod: 76

## 2022-05-19 PROCEDURE — 99220: CPT

## 2022-05-19 RX ORDER — ALPRAZOLAM 0.25 MG
0.25 TABLET ORAL ONCE
Refills: 0 | Status: DISCONTINUED | OUTPATIENT
Start: 2022-05-19 | End: 2022-05-19

## 2022-05-19 RX ORDER — ASPIRIN/CALCIUM CARB/MAGNESIUM 324 MG
324 TABLET ORAL ONCE
Refills: 0 | Status: COMPLETED | OUTPATIENT
Start: 2022-05-19 | End: 2022-05-19

## 2022-05-19 RX ADMIN — Medication 0.25 MILLIGRAM(S): at 15:13

## 2022-05-19 RX ADMIN — Medication 324 MILLIGRAM(S): at 12:20

## 2022-05-19 NOTE — ED ADULT NURSE REASSESSMENT NOTE - NS ED NURSE REASSESS COMMENT FT1
Pt resting comfortably and watching television on stretcher at this time. VSS. Neg SOB. Call bell within reach. Pt denies needs at this time. Pt safety and comfort continues to be maintained; will continue to monitor.

## 2022-05-19 NOTE — ED ADULT NURSE NOTE - OBJECTIVE STATEMENT
The patient is a 56y Female complaining of palpitations x this morning, patient on estrogen patch for menopause, patient states was seen here last week for same thing with neg results, Hx anxiety

## 2022-05-19 NOTE — ED PROVIDER NOTE - MDM ORDERS SUBMITTED SELECTION
----- Message from Kirstin Jerome sent at 11/27/2017  9:39 AM CST -----  Contact: Self  Patient needs first available appointment due to Neck Pain/headaches and Hip Pain.  Patient states he was under for injections- Steroids (he thinks) given by By Dr. Avila.  Patient states injections did not work, patient states.  Patient states he did get some releaf in his hips.  Patient has release papers from this provider, he will fax to you today.  Please call patient at [phone]. Thanks!  
Spoke with patient. Appointment scheduled for 12-21. Asked patient to be here a few minutes early and to check in on the 2nd floor and bring any outside imaging. Verbalized understanding.   
Labs/EKG/Imaging Studies/Medications

## 2022-05-19 NOTE — ED PROVIDER NOTE - CARE PLAN
Principal Discharge DX:	Chest tightness   1 Principal Discharge DX:	Chest pain  Secondary Diagnosis:	Palpitations

## 2022-05-19 NOTE — ED CDU PROVIDER INITIAL DAY NOTE - ATTENDING APP SHARED VISIT CONTRIBUTION OF CARE
no
56-year-old woman, history of gastric sleeve, was placed in CDU for work-up of chest tightness, intermittent, associated with palpitations, over the last 1 week.  Patient was in the ED a few days ago for same symptoms on May 13, had PE work-up that was negative and negative troponin x2.  She has a follow-up appointment scheduled with cardiology, but pain persisted so she came back to the ED.  Vital signs, exam as noted.  ED work-up was unremarkable.  Plan is for telemetry, serial EKG and enzymes, nuclear stress test.

## 2022-05-19 NOTE — ED CDU PROVIDER INITIAL DAY NOTE - PROGRESS NOTE DETAILS
received signout from Gunnar Antonio - pt in obs for evaluation of chest pain; pt previously seen in Ed for cp; ce/ekg x 2 neg; ddimer elevated cta neg for pe; d/c to f/u with Dr. Angel 6/13; returned for cp; here ce/ekg x 2 neg; will plan for exe stress in am;

## 2022-05-19 NOTE — ED PROVIDER NOTE - NS ED ROS FT
Review of Systems:  •	CONSTITUTIONAL - No fever, No diaphoresis, No weight change  •	SKIN - No rash  •	HEMATOLOGIC - No abnormal bleeding or bruising  •	EYES - No eye pain, No blurred vision  •	ENT - No change in hearing, No sore throat, No neck pain, No rhinorrhea, No ear pain  •	RESPIRATORY - No shortness of breath, No cough  •	CARDIAC -+ chest tightness, + palpitations  •	GI - No abdominal pain, No nausea, No vomiting, No diarrhea, No constipation, No bright red blood per rectum or melena. No flank pain  •                 - No dysuria, frequency, hematuria.   •	ENDO - No polydypsia, No polyuria, No heat/cold intolerance  •	MUSCULOSKELETAL - No joint paint, No swelling, No back pain  •	NEUROLOGIC - No numbness, No focal weakness, No headache, No dizziness  All other systems negative, unless specified in HPI

## 2022-05-19 NOTE — ED PROVIDER NOTE - OBJECTIVE STATEMENT
Patient is a 55 yo female with s/p gastric sleeve, on estrogen patch c/o palpitations and chest tightness since last week. Last week, patient started with gradual onset of mild intermittent palpitations and anxiety with associated left sided mild chest tightness. Denies fever, chills, SOB, cough, abdominal pain, n/v/d. Went to the ED last week and had CTA chest and was normal. Has appointment with cardiologist Dr. Kemp in June.

## 2022-05-19 NOTE — ED CDU PROVIDER INITIAL DAY NOTE - OBJECTIVE STATEMENT
57 y/o F, PMHx Gastric Sleeve, on estrogen patch x 5 yrs, presents to the ED with complaints of palpitations and left sided chest tightness that has been intermittent x one week. She states that episodes have become more frequent, prompting return ED visit. She was in the ED for similar symptoms on 5/13- underwent a CTA-Chest that was unremarkable and had lab-work (trop neg x 2) - she was ultimately discharged to home with cardiology f/u. She scheduled an appt with Dr. Kemp (has never seen before) on 6/13. She admits to being anxious; denies dyspnea, lower extremity swelling, recent illness, fever, chills, nausea, vomiting, and abdominal pain. She is not a smoker; admits to a FHx of CAD- Father had MI in his 50's. She has had a prior stress test however this was over ten years ago.     57 yo female with s/p gastric sleeve, on estrogen patch c/o palpitations and chest tightness since last week. Last week, patient started with gradual onset of mild intermittent palpitations and anxiety with associated left sided mild chest tightness. Denies fever, chills, SOB, cough, abdominal pain, n/v/d. Went to the ED last week and had CTA chest and was normal. Has appointment with cardiologist Dr. Kemp in June. 57 y/o F, PMHx Gastric Sleeve, on estrogen patch x 5 yrs, presents to the ED with complaints of palpitations and left sided chest tightness that has been intermittent x one week. She states that episodes have become more frequent, prompting return ED visit. She was in the ED for similar symptoms on 5/13- underwent a CTA-Chest that was unremarkable and had lab-work (trop neg x 2) - she was ultimately discharged to home with cardiology f/u. She scheduled an appt with Dr. Kemp (has never seen before) on 6/13. She admits to being anxious; denies dyspnea, lower extremity swelling, recent illness, fever, chills, nausea, vomiting, and abdominal pain. She is not a smoker; admits to a FHx of CAD- Father had MI in his 50's. She has had a prior stress test however this was over ten years ago.

## 2022-05-19 NOTE — ED ADULT NURSE REASSESSMENT NOTE - NS ED NURSE REASSESS COMMENT FT1
Assumed care of patient. AOx4, Neg SOB. VSS. Pt placed on OBS due to palpitations/chest pain. Pt has pmhx of anxiety and cervical ca. Pt has left AC 18 g IV patent to flush. Pt scheduled for stress test in the am. Pt re-educated on call be use. Pt denies needs at this time. Pt safety and comfort continues to be maintained. Will continue to monitor.

## 2022-05-19 NOTE — ED ADULT NURSE NOTE - INTERVENTIONS DEFINITIONS
[place] to call system/Call bell, personal items and telephone within reach/Instruct patient to call for assistance/Room bathroom lighting operational/Non-slip footwear when patient is off stretcher/Physically safe environment: no spills, clutter or unnecessary equipment/Stretcher in lowest position, wheels locked, appropriate side rails in place/Provide visual cue, wrist band, yellow gown, etc./Monitor gait and stability

## 2022-05-19 NOTE — ED PROVIDER NOTE - NSICDXFAMILYHX_GEN_ALL_CORE_FT
FAMILY HISTORY:  Mother  Still living? Unknown  Family history of myocardial infarction, Age at diagnosis: 41-50

## 2022-05-19 NOTE — ED PROVIDER NOTE - PROGRESS NOTE DETAILS
MQ: Will obtain ecg, cxr, labs, and reassess Patient assessed by the bedside.  She is reporting recurrent chest pain, feels like tightness.  Vitals are stable.  Will obtain repeat EKG and will give aspirin.  Recommend ops for diagnostic uncertainty, recommend CCTA. Patient assessed by the bedside.  She is reporting recurrent chest pain, feels like tightness.  Vitals are stable.  Will obtain repeat EKG and will give aspirin.  Recommend obs for diagnostic uncertainty, recommend cardiac w/u.

## 2022-05-19 NOTE — ED PROVIDER NOTE - NSICDXPASTSURGICALHX_GEN_ALL_CORE_FT
PAST SURGICAL HISTORY:  H/O gastric sleeve     H/O:       Full Thickness Lip Wedge Repair (Flap) Text: Given the location of the defect and the proximity to free margins a full thickness wedge repair was deemed most appropriate.  Using a sterile surgical marker, the appropriate repair was drawn incorporating the defect and placing the expected incisions perpendicular to the vermilion border.  The vermilion border was also meticulously outlined to ensure appropriate reapproximation during the repair.  The area thus outlined was incised through and through with a #15 scalpel blade.  The muscularis and dermis were reaproximated with deep sutures following hemostasis. Care was taken to realign the vermilion border before proceeding with the superficial closure.  Once the vermilion was realigned the superfical and mucosal closure was finished.

## 2022-05-19 NOTE — ED ADULT TRIAGE NOTE - CHIEF COMPLAINT QUOTE
c/o palpitations x this morning, patient on estrogen patch for menopause, patient states was seen here last week for same thing with neg results, Hx anxiety

## 2022-05-19 NOTE — ED PROVIDER NOTE - ATTENDING CONTRIBUTION TO CARE
56-year-old female with remote history of gastric sleeve who presents to the ER for chest tightness that started again today. Pain is nonexertional, radiates to her left upper extremity. She presented to the ER on May 13 with similar symptoms, ongoing for 3 days. Work-up including CT angio chest was within normal limits.  She was given cardiology follow-up.  She has an appointment with a cardiologist in . She denies tobacco use.  She reports her father  in his 60s due to CAD and her mom had ACS in her 40s.    Vitals noted, triage note reviewed    Gen - NAD, Head - NCAT, Pharynx - clear, MMM, Heart - RRR, no m/g/r, Lungs - CTAB, no w/c/r, Abdomen - soft, NT, ND, Skin - No rash, Extremities - FROM, no edema, erythema, ecchymosis, brisk cap refill, Neuro - A&O x3, equal strength and sensation, non-focal exam    a/p:  CP + FHx CAD in both parents   seen in the ED 6d ago with similar sx, CTA neg for PE  monitoring on tele  EKG, CXR, labs, recommend obs for CCTA

## 2022-05-19 NOTE — ED PROVIDER NOTE - PHYSICAL EXAMINATION
CONSTITUTIONAL: in no acute distress, anxious  SKIN: warm, dry  HEAD: Normocephalic; atraumatic.  EYES: no conjunctival injection. PERRL.   ENT: No nasal discharge; airway clear.  NECK: Supple; non tender.  CARD: S1, S2 normal; no murmurs, gallops, or rubs. Regular rate and rhythm.   RESP: No wheezes, rales or rhonchi.  ABD: soft ntnd  EXT: Normal ROM.  No clubbing, cyanosis or edema.   LYMPH: No acute cervical adenopathy.  NEURO: Alert, oriented, grossly unremarkable  PSYCH: Cooperative, appropriate.

## 2022-05-19 NOTE — ED CDU PROVIDER INITIAL DAY NOTE - MEDICAL DECISION MAKING DETAILS
56-year-old woman, history of gastric sleeve, was placed in CDU for work-up of chest tightness, intermittent, associated with palpitations, over the last 1 week.  Patient was in the ED a few days ago for same symptoms on May 13, had PE work-up that was negative and negative troponin x2.  She has a follow-up appointment scheduled with cardiology, but pain persisted so she came back to the ED.  Vital signs, exam as noted.  ED work-up was unremarkable.  Plan is for telemetry, serial EKG and enzymes, nuclear stress test.

## 2022-05-19 NOTE — ED PROVIDER NOTE - CLINICAL SUMMARY MEDICAL DECISION MAKING FREE TEXT BOX
56-year-old female with remote history of gastric sleeve who presents to the ER for chest tightness that started again today. Labs and imaging reviewed.  EKG nonischemic, troponin negative x1.  Patient was recommended ops for diagnostic uncertainty.  Recommended CCTA.

## 2022-05-20 VITALS
OXYGEN SATURATION: 100 % | SYSTOLIC BLOOD PRESSURE: 124 MMHG | DIASTOLIC BLOOD PRESSURE: 62 MMHG | HEART RATE: 78 BPM | RESPIRATION RATE: 18 BRPM

## 2022-05-20 PROCEDURE — 78452 HT MUSCLE IMAGE SPECT MULT: CPT | Mod: 26,MA

## 2022-05-20 PROCEDURE — 93018 CV STRESS TEST I&R ONLY: CPT

## 2022-05-20 PROCEDURE — 93016 CV STRESS TEST SUPVJ ONLY: CPT

## 2022-05-20 PROCEDURE — 99217: CPT

## 2022-05-20 NOTE — ED ADULT NURSE REASSESSMENT NOTE - NS ED NURSE REASSESS COMMENT FT1
Pt sleeping comfortably on stretcher at this time. VSS. Neg SOB. Call bell within reach. Pt safety and comfort continues to be maintained. Will continue to monitor at this time.

## 2022-05-20 NOTE — ED CDU PROVIDER SUBSEQUENT DAY NOTE - WR ORDER ID 1
Pt is A&Ox4, resting comfortably in bed. Assessment completed and denies any pain. Due medication have been given. Pt is up for meal, bed is in lowest postion, and side rails up x2, pt calls when needs assistance and call light within reach.   [id number]

## 2022-05-20 NOTE — ED ADULT NURSE REASSESSMENT NOTE - NS ED NURSE REASSESS COMMENT FT1
Patient is on continuous monitoring with normal sinus rhythm, and pulse oximetry.  She does not report having any chest pain, shortness of breath, or weakness.  No other complaints.  Patient just returned from nuclear stress test.

## 2022-05-20 NOTE — ED CDU PROVIDER DISPOSITION NOTE - CLINICAL COURSE
56-year-old woman, history of gastric sleeve, was placed in CDU for work-up of chest tightness, intermittent, associated with palpitations, over the last 1 week.  Patient was in the ED a few days ago for same symptoms on May 13, had PE work-up that was negative and negative troponin x2.  She has a follow-up appointment scheduled with cardiology, but pain persisted so she came back to the ED. Vital signs, exam as noted.  ED work-up was unremarkable. Patient was monitored without incident, repeat EKG and enzymes unchanged.  Nuclear stress test was negative for ischemia.  Results were discussed with patient, she was given copies of work-up, and discharged to follow-up with cardiology as previously scheduled.  Return precautions discussed.

## 2022-05-20 NOTE — ED CDU PROVIDER DISPOSITION NOTE - PATIENT PORTAL LINK FT
You can access the FollowMyHealth Patient Portal offered by [facility] by registering at the following website: [email or web address]. By joining [employer]’s FollowMyHealth portal, you will also be able to view your health information using other applications (apps) compatible with our system.

## 2022-05-20 NOTE — ED CDU PROVIDER DISPOSITION NOTE - CARE PROVIDER_API CALL
IT IS ACTUALLY APPROVED UNTIL 7/20/19, LM AT [facility]  Dwayne Robles (MD)  Cardiovascular Disease; Interventional Cardiology  [address]  [place] [address]  Phone: [phone]  Fax: [phone]  Follow Up Time:

## 2022-05-20 NOTE — ED ADULT NURSE REASSESSMENT NOTE - NS ED NURSE REASSESS COMMENT FT1
Upon walking rounds, pt awake and alert. Pt denies palpitations, chest pain, SOB. Sinus bradycardia noted on monitor, pt asymptomatic. S1, S2 heart sounds, lungs CTAB. Pt awaiting nuclear stress test. Comfort and safety maintained.

## 2022-05-20 NOTE — ED CDU PROVIDER SUBSEQUENT DAY NOTE - MEDICAL DECISION MAKING DETAILS
56-year-old woman, history of gastric sleeve, was placed in CDU for work-up of chest tightness, intermittent, associated with palpitations, over the last 1 week.  Patient was in the ED a few days ago for same symptoms on May 13, had PE work-up that was negative and negative troponin x2.  She has a follow-up appointment scheduled with cardiology, but pain persisted so she came back to the ED. Vital signs, exam as noted.  ED work-up was unremarkable. Patient was monitored without incident, repeat EKG and enzymes unchanged.  Plan is for nuclear stress test.

## 2022-06-22 NOTE — ED PROVIDER NOTE - CARE PROVIDER_API CALL
----- Message from SAVANNAH Lobo sent at 6/22/2022  7:46 AM CDT -----  Please call patient and have her schedule with Dr. Anson Maldonado in endocrinology for follow up of the hyperthyroid, plan to see him in 3-4 months per Dr. Maldonado's message to Dr. Sanders. Needs labs repeated in 6-8 weeks. Thank you   Dwayne Robles (MD)  Cardiovascular Disease; Interventional Cardiology  [address]  [place] [address]  Phone: [phone]  Fax: [phone]  Follow Up Time:

## 2022-12-22 ENCOUNTER — OUTPATIENT (OUTPATIENT)
Dept: OUTPATIENT SERVICES | Facility: HOSPITAL | Age: 57
LOS: 1 days | Discharge: HOME | End: 2022-12-22

## 2022-12-22 DIAGNOSIS — Z90.3 ACQUIRED ABSENCE OF STOMACH [PART OF]: Chronic | ICD-10-CM

## 2022-12-22 DIAGNOSIS — Z12.31 ENCOUNTER FOR SCREENING MAMMOGRAM FOR MALIGNANT NEOPLASM OF BREAST: ICD-10-CM

## 2022-12-22 DIAGNOSIS — Z98.891 HISTORY OF UTERINE SCAR FROM PREVIOUS SURGERY: Chronic | ICD-10-CM

## 2022-12-22 PROCEDURE — 77067 SCR MAMMO BI INCL CAD: CPT | Mod: 26

## 2022-12-22 PROCEDURE — 77063 BREAST TOMOSYNTHESIS BI: CPT | Mod: 26

## 2022-12-23 DIAGNOSIS — N95.9 UNSPECIFIED MENOPAUSAL AND PERIMENOPAUSAL DISORDER: ICD-10-CM

## 2022-12-23 DIAGNOSIS — Z13.820 ENCOUNTER FOR SCREENING FOR OSTEOPOROSIS: ICD-10-CM

## 2023-02-03 ENCOUNTER — NON-APPOINTMENT (OUTPATIENT)
Age: 58
End: 2023-02-03

## 2023-05-17 ENCOUNTER — NON-APPOINTMENT (OUTPATIENT)
Age: 58
End: 2023-05-17

## 2023-11-16 ENCOUNTER — NON-APPOINTMENT (OUTPATIENT)
Age: 58
End: 2023-11-16

## 2024-01-30 ENCOUNTER — NON-APPOINTMENT (OUTPATIENT)
Age: 59
End: 2024-01-30

## 2024-01-30 ENCOUNTER — INPATIENT (INPATIENT)
Facility: HOSPITAL | Age: 59
LOS: 0 days | Discharge: HOME | DRG: 310 | End: 2024-01-31
Attending: HOSPITALIST | Admitting: HOSPITALIST
Payer: COMMERCIAL

## 2024-01-30 VITALS
OXYGEN SATURATION: 99 % | SYSTOLIC BLOOD PRESSURE: 132 MMHG | DIASTOLIC BLOOD PRESSURE: 62 MMHG | WEIGHT: 179.9 LBS | RESPIRATION RATE: 18 BRPM | HEART RATE: 150 BPM | TEMPERATURE: 97 F

## 2024-01-30 DIAGNOSIS — Z98.891 HISTORY OF UTERINE SCAR FROM PREVIOUS SURGERY: Chronic | ICD-10-CM

## 2024-01-30 DIAGNOSIS — Z90.3 ACQUIRED ABSENCE OF STOMACH [PART OF]: Chronic | ICD-10-CM

## 2024-01-30 DIAGNOSIS — I48.91 UNSPECIFIED ATRIAL FIBRILLATION: ICD-10-CM

## 2024-01-30 LAB
ALBUMIN SERPL ELPH-MCNC: 4 G/DL — SIGNIFICANT CHANGE UP (ref 3.5–5.2)
ALP SERPL-CCNC: 38 U/L — SIGNIFICANT CHANGE UP (ref 30–115)
ALT FLD-CCNC: 14 U/L — SIGNIFICANT CHANGE UP (ref 0–41)
ANION GAP SERPL CALC-SCNC: 10 MMOL/L — SIGNIFICANT CHANGE UP (ref 7–14)
AST SERPL-CCNC: 34 U/L — SIGNIFICANT CHANGE UP (ref 0–41)
BASOPHILS # BLD AUTO: 0.03 K/UL — SIGNIFICANT CHANGE UP (ref 0–0.2)
BASOPHILS NFR BLD AUTO: 0.6 % — SIGNIFICANT CHANGE UP (ref 0–1)
BILIRUB SERPL-MCNC: 0.5 MG/DL — SIGNIFICANT CHANGE UP (ref 0.2–1.2)
BUN SERPL-MCNC: 13 MG/DL — SIGNIFICANT CHANGE UP (ref 10–20)
CALCIUM SERPL-MCNC: 8.6 MG/DL — SIGNIFICANT CHANGE UP (ref 8.4–10.5)
CHLORIDE SERPL-SCNC: 107 MMOL/L — SIGNIFICANT CHANGE UP (ref 98–110)
CO2 SERPL-SCNC: 23 MMOL/L — SIGNIFICANT CHANGE UP (ref 17–32)
CREAT SERPL-MCNC: 0.8 MG/DL — SIGNIFICANT CHANGE UP (ref 0.7–1.5)
D DIMER BLD IA.RAPID-MCNC: 227 NG/ML DDU — SIGNIFICANT CHANGE UP
EGFR: 85 ML/MIN/1.73M2 — SIGNIFICANT CHANGE UP
EOSINOPHIL # BLD AUTO: 0.02 K/UL — SIGNIFICANT CHANGE UP (ref 0–0.7)
EOSINOPHIL NFR BLD AUTO: 0.4 % — SIGNIFICANT CHANGE UP (ref 0–8)
GLUCOSE SERPL-MCNC: 80 MG/DL — SIGNIFICANT CHANGE UP (ref 70–99)
HCT VFR BLD CALC: 42.3 % — SIGNIFICANT CHANGE UP (ref 37–47)
HGB BLD-MCNC: 13.9 G/DL — SIGNIFICANT CHANGE UP (ref 12–16)
IMM GRANULOCYTES NFR BLD AUTO: 0.2 % — SIGNIFICANT CHANGE UP (ref 0.1–0.3)
LYMPHOCYTES # BLD AUTO: 1.36 K/UL — SIGNIFICANT CHANGE UP (ref 1.2–3.4)
LYMPHOCYTES # BLD AUTO: 29.2 % — SIGNIFICANT CHANGE UP (ref 20.5–51.1)
MAGNESIUM SERPL-MCNC: 1.8 MG/DL — SIGNIFICANT CHANGE UP (ref 1.8–2.4)
MCHC RBC-ENTMCNC: 30.8 PG — SIGNIFICANT CHANGE UP (ref 27–31)
MCHC RBC-ENTMCNC: 32.9 G/DL — SIGNIFICANT CHANGE UP (ref 32–37)
MCV RBC AUTO: 93.6 FL — SIGNIFICANT CHANGE UP (ref 81–99)
MONOCYTES # BLD AUTO: 0.34 K/UL — SIGNIFICANT CHANGE UP (ref 0.1–0.6)
MONOCYTES NFR BLD AUTO: 7.3 % — SIGNIFICANT CHANGE UP (ref 1.7–9.3)
NEUTROPHILS # BLD AUTO: 2.9 K/UL — SIGNIFICANT CHANGE UP (ref 1.4–6.5)
NEUTROPHILS NFR BLD AUTO: 62.3 % — SIGNIFICANT CHANGE UP (ref 42.2–75.2)
NRBC # BLD: 0 /100 WBCS — SIGNIFICANT CHANGE UP (ref 0–0)
PLATELET # BLD AUTO: 232 K/UL — SIGNIFICANT CHANGE UP (ref 130–400)
PMV BLD: 12.5 FL — HIGH (ref 7.4–10.4)
POTASSIUM SERPL-MCNC: 5.4 MMOL/L — HIGH (ref 3.5–5)
POTASSIUM SERPL-SCNC: 5.4 MMOL/L — HIGH (ref 3.5–5)
PROT SERPL-MCNC: 6.7 G/DL — SIGNIFICANT CHANGE UP (ref 6–8)
RBC # BLD: 4.52 M/UL — SIGNIFICANT CHANGE UP (ref 4.2–5.4)
RBC # FLD: 13.7 % — SIGNIFICANT CHANGE UP (ref 11.5–14.5)
SODIUM SERPL-SCNC: 140 MMOL/L — SIGNIFICANT CHANGE UP (ref 135–146)
TROPONIN T, HIGH SENSITIVITY RESULT: 8 NG/L — SIGNIFICANT CHANGE UP (ref 6–13)
WBC # BLD: 4.66 K/UL — LOW (ref 4.8–10.8)
WBC # FLD AUTO: 4.66 K/UL — LOW (ref 4.8–10.8)

## 2024-01-30 PROCEDURE — 99291 CRITICAL CARE FIRST HOUR: CPT

## 2024-01-30 PROCEDURE — 99223 1ST HOSP IP/OBS HIGH 75: CPT

## 2024-01-30 PROCEDURE — 99222 1ST HOSP IP/OBS MODERATE 55: CPT

## 2024-01-30 PROCEDURE — 80053 COMPREHEN METABOLIC PANEL: CPT

## 2024-01-30 PROCEDURE — 84443 ASSAY THYROID STIM HORMONE: CPT

## 2024-01-30 PROCEDURE — 83735 ASSAY OF MAGNESIUM: CPT

## 2024-01-30 PROCEDURE — 84100 ASSAY OF PHOSPHORUS: CPT

## 2024-01-30 PROCEDURE — 71045 X-RAY EXAM CHEST 1 VIEW: CPT | Mod: 26

## 2024-01-30 PROCEDURE — 36415 COLL VENOUS BLD VENIPUNCTURE: CPT

## 2024-01-30 PROCEDURE — 85025 COMPLETE CBC W/AUTO DIFF WBC: CPT

## 2024-01-30 PROCEDURE — G0378: CPT

## 2024-01-30 PROCEDURE — 93308 TTE F-UP OR LMTD: CPT | Mod: 26

## 2024-01-30 RX ORDER — METOPROLOL TARTRATE 50 MG
2.5 TABLET ORAL ONCE
Refills: 0 | Status: COMPLETED | OUTPATIENT
Start: 2024-01-30 | End: 2024-01-30

## 2024-01-30 RX ORDER — SODIUM CHLORIDE 9 MG/ML
1000 INJECTION, SOLUTION INTRAVENOUS ONCE
Refills: 0 | Status: DISCONTINUED | OUTPATIENT
Start: 2024-01-30 | End: 2024-01-30

## 2024-01-30 RX ORDER — APIXABAN 2.5 MG/1
5 TABLET, FILM COATED ORAL EVERY 12 HOURS
Refills: 0 | Status: DISCONTINUED | OUTPATIENT
Start: 2024-01-30 | End: 2024-01-31

## 2024-01-30 RX ORDER — DILTIAZEM HCL 120 MG
10 CAPSULE, EXT RELEASE 24 HR ORAL ONCE
Refills: 0 | Status: COMPLETED | OUTPATIENT
Start: 2024-01-30 | End: 2024-01-30

## 2024-01-30 RX ORDER — DIGOXIN 250 MCG
250 TABLET ORAL EVERY 6 HOURS
Refills: 0 | Status: DISCONTINUED | OUTPATIENT
Start: 2024-01-30 | End: 2024-01-31

## 2024-01-30 RX ORDER — LANOLIN ALCOHOL/MO/W.PET/CERES
10 CREAM (GRAM) TOPICAL AT BEDTIME
Refills: 0 | Status: DISCONTINUED | OUTPATIENT
Start: 2024-01-30 | End: 2024-01-31

## 2024-01-30 RX ORDER — SODIUM CHLORIDE 9 MG/ML
1000 INJECTION, SOLUTION INTRAVENOUS ONCE
Refills: 0 | Status: COMPLETED | OUTPATIENT
Start: 2024-01-30 | End: 2024-01-30

## 2024-01-30 RX ORDER — DILTIAZEM HCL 120 MG
5 CAPSULE, EXT RELEASE 24 HR ORAL
Qty: 125 | Refills: 0 | Status: DISCONTINUED | OUTPATIENT
Start: 2024-01-30 | End: 2024-01-31

## 2024-01-30 RX ORDER — CHLORHEXIDINE GLUCONATE 213 G/1000ML
1 SOLUTION TOPICAL
Refills: 0 | Status: DISCONTINUED | OUTPATIENT
Start: 2024-01-30 | End: 2024-01-31

## 2024-01-30 RX ORDER — DIGOXIN 250 MCG
500 TABLET ORAL ONCE
Refills: 0 | Status: COMPLETED | OUTPATIENT
Start: 2024-01-30 | End: 2024-01-30

## 2024-01-30 RX ORDER — DILTIAZEM HCL 120 MG
15 CAPSULE, EXT RELEASE 24 HR ORAL ONCE
Refills: 0 | Status: COMPLETED | OUTPATIENT
Start: 2024-01-30 | End: 2024-01-30

## 2024-01-30 RX ORDER — TIRZEPATIDE 15 MG/.5ML
5 INJECTION, SOLUTION SUBCUTANEOUS
Refills: 0 | DISCHARGE

## 2024-01-30 RX ORDER — MAGNESIUM SULFATE 500 MG/ML
2 VIAL (ML) INJECTION ONCE
Refills: 0 | Status: COMPLETED | OUTPATIENT
Start: 2024-01-30 | End: 2024-01-30

## 2024-01-30 RX ORDER — ACETAMINOPHEN 500 MG
650 TABLET ORAL EVERY 6 HOURS
Refills: 0 | Status: DISCONTINUED | OUTPATIENT
Start: 2024-01-30 | End: 2024-01-31

## 2024-01-30 RX ADMIN — Medication 5 MG/HR: at 11:10

## 2024-01-30 RX ADMIN — Medication 15 MILLIGRAM(S): at 11:10

## 2024-01-30 RX ADMIN — Medication 15 MILLIGRAM(S): at 12:26

## 2024-01-30 RX ADMIN — Medication 500 MICROGRAM(S): at 15:27

## 2024-01-30 RX ADMIN — Medication 2.5 MILLIGRAM(S): at 14:10

## 2024-01-30 RX ADMIN — SODIUM CHLORIDE 1000 MILLILITER(S): 9 INJECTION, SOLUTION INTRAVENOUS at 10:59

## 2024-01-30 RX ADMIN — Medication 10 MILLIGRAM(S): at 22:31

## 2024-01-30 RX ADMIN — APIXABAN 5 MILLIGRAM(S): 2.5 TABLET, FILM COATED ORAL at 18:25

## 2024-01-30 RX ADMIN — Medication 2.5 MILLIGRAM(S): at 13:27

## 2024-01-30 RX ADMIN — SODIUM CHLORIDE 1000 MILLILITER(S): 9 INJECTION, SOLUTION INTRAVENOUS at 11:24

## 2024-01-30 RX ADMIN — Medication 100 GRAM(S): at 10:58

## 2024-01-30 NOTE — H&P ADULT - ASSESSMENT
58-year-old female past medical history of cervical cancer in remission presenting to ED for  palpitations.      #New AF w RVR   -cont cardizem gtt   -digoxin load ,  mcg now followed by 250 mcg every 6 hours x 2   -PEREZ-DCCV tomorrow   -NPO past midnight   -check TSH   - eliquis 5 mg BID    58-year-old female past medical history of cervical cancer in remission presenting to ED for  palpitations.      #New AF w RVR   -[id number]  1   -cont cardizem gtt   -s/p digoxin load ,  mcg x1   -c/w with  digoxin 250 mcg every 6 hours x 2   -PEREZ-DCCV planned for  tomorrow   -NPO @ [facility]   -f/u TSH   - Started on Eliquis 5 mg BID       DVT ppx: Eliquis   Diet: DASH   Code: FULL  Activity: IAT    58-year-old female past medical history of cervical cancer in remission presenting to ED for  palpitations.      #New AF w RVR   -No hx of previous cardiac conditions   -[id number]  1   -cont cardizem gtt   -s/p digoxin load ,  mcg x1   -c/w with  digoxin 250 mcg every 6 hours x 2   -PEREZ-DCCV planned for  tomorrow   -NPO @ [facility]   -f/u TSH   -monitor lytes, replete prn   - Started on Eliquis 5 mg BID       DVT ppx: Eliquis   Diet: DASH   Code: FULL  Activity: IAT

## 2024-01-30 NOTE — H&P ADULT - NSHPLABSRESULTS_GEN_ALL_CORE
.  LABS:                         13.9   4.66  )-----------( 232      ( 30 Jan 2024 11:05 )             42.3     01-30    140  |  107  |  13  ----------------------------<  80  5.4<H>   |  23  |  0.8    Ca    8.6      30 Jan 2024 11:05  Mg     1.8     01-30    TPro  6.7  /  Alb  4.0  /  TBili  0.5  /  DBili  x   /  AST  34  /  ALT  14  /  AlkPhos  38  01-30      Urinalysis Basic - ( 30 Jan 2024 11:05 )    Color: x / Appearance: x / SG: x / pH: x  Gluc: 80 mg/dL / Ketone: x  / Bili: x / Urobili: x   Blood: x / Protein: x / Nitrite: x   Leuk Esterase: x / RBC: x / WBC x   Sq Epi: x / Non Sq Epi: x / Bacteria: x            RADIOLOGY, EKG & ADDITIONAL TESTS: Reviewed.

## 2024-01-30 NOTE — ED PROVIDER NOTE - ATTENDING CONTRIBUTION TO CARE
57 yo f hx hx cervical ca in remission on estrogen patch, on weight loss medication psh-  s/p gastric sleeve,  pt presents for eval of palpitations. pt states ~2 hrs pta, pt had breakfast, and felt bloated. pt walked around and started feeling palpitations, mild lightheadedness. sx did not improve on their own so she called ems. per ems- pt was in rapid afib w/ rate ~150-190 and was given cardizem 20mg IV. rate improved but BP dropped transiently.  no syncope, trauma, chest pain, recent surgery, recent travel.  no hx pe/vte. no leg swelling/hemoptysis. no pleuritic pain.  no hx afib.   pt endorses     vs rapid irreg   gen- NAD, aaox3  card-rapid irreg  lungs-ctab, no wheezing or rhonchi  abd-sntnd, no guarding or rebound  neuro- full str/sensation, cn ii-xii grossly intact, normal coordination

## 2024-01-30 NOTE — ED ADULT NURSE NOTE - NSFALLUNIVINTERV_ED_ALL_ED
Bed/Stretcher in lowest position, wheels locked, appropriate side rails in place/Call bell, personal items and telephone in reach/Instruct patient to call for assistance before getting out of bed/chair/stretcher/Non-slip footwear applied when patient is off stretcher/[place] to call system/Physically safe environment - no spills, clutter or unnecessary equipment/Purposeful proactive rounding/Room/bathroom lighting operational, light cord in reach

## 2024-01-30 NOTE — H&P ADULT - ATTENDING COMMENTS
Patient seen and examined at bedside independently of the residents. I read the resident's note and agree with the plan with the additions and corrections as noted below. My note supersedes the resident's note.     REVIEW OF SYSTEMS:  Negative except in HPI.     PMH: Cervical cancer (in remission)     FHx: Reviewed. No fhx of asthma/copd, No fhx of liver and pulmonary disease. No fhx of hematological disorder.     Physical Exam:  GEN: No acute distress. Awake, Alert and oriented x 3.   Head: Atraumatic, Normocephalic.   Eye: PEERLA. No sclera icterus. EOMI.   ENT: Normal oropharynx, no thyromegaly, no mass, no lymphadenopathy.   LUNGS: Clear to auscultation bilaterally. No wheeze/rales/crackles.   HEART: Normal. S1/S2 present. Irregularly irregular. No murmur/gallops.   ABD: Soft, non-tender, non-distended. Bowel sounds present.   EXT: No pitting edema. No erythema. No tenderness.  Integumentary: No rash, No sore, No petechia.   NEURO: CN III-XII intact. Strength: 5/5 b/l ULE. Sensory intact b/l ULE.     Vital Signs Last 24 Hrs  T(C): 36.2 (2024 16:50), Max: 36.2 (2024 16:50)  T(F): 97.2 (2024 16:50), Max: 97.2 (2024 16:50)  HR: 104 (2024 16:50) (104 - 175)  BP: 99/56 (2024 16:50) (99/56 - 132/62)  BP(mean): 72 (2024 13:49) (72 - 72)  RR: 16 (2024 16:50) (16 - 18)  SpO2: 99% (2024 16:50) (99% - 99%)    Parameters below as of 2024 16:50  Patient On (Oxygen Delivery Method): room air      Please see the above notes for Labs and radiology.     Assessment and Plan:     59 yo F with hx of Cervical cancer (in remission) presents to ED for palpitation 2 hr prior to presentation.     Paroxysmal A.fib RVR   - EKG shows A.fib RVR with HR ~ 137.  - s/p IV Cardizem and lopressor given in ED.   - c/w Cardizem drip for now.   - will also give digoxin for additional HR control as per Cardio.   - will also start on eliquis 5mg BID   - check TSH    - NPO after MN for PEREZ/DCCV in AM.   - f/u Cardiology.     DVT ppx: eliquis  GI ppx: not indicated.   Diet: DASH diet   Activity: as tolerated.     Date seen by the attendin2024  Total time spent: 75 minutes.

## 2024-01-30 NOTE — ED PROVIDER NOTE - CLINICAL SUMMARY MEDICAL DECISION MAKING FREE TEXT BOX
in ED, pt in afib w/ rvr  labs w/o acute findings, dimer negative for low risk PE as trigger  pt given dilt pushes and drip but pt still rapid. metoprolol added but pt remained 130s-150s. cards consulted who rec dig load with improvement of HR  will admit to med [facility] for monitoring and further treatment

## 2024-01-30 NOTE — H&P ADULT - NSHPPHYSICALEXAM_GEN_ALL_CORE
LOS:     VITALS:   T(C): 36.2 (01-30-24 @ 16:50), Max: 36.2 (01-30-24 @ 16:50)  HR: 104 (01-30-24 @ 16:50) (104 - 175)  BP: 99/56 (01-30-24 @ 16:50) (99/56 - 132/62)  RR: 16 (01-30-24 @ 16:50) (16 - 18)  SpO2: 99% (01-30-24 @ 16:50) (99% - 99%)    GENERAL: NAD, lying in bed comfortably  HEAD:  Atraumatic, Normocephalic  EYES: EOMI, PERRLA, conjunctiva and sclera clear  ENT: Moist mucous membranes  NECK: Supple, No JVD  CHEST/LUNG: Clear to auscultation bilaterally; No rales, rhonchi, wheezing, or rubs. Unlabored respirations  HEART: Regular rate and rhythm; No murmurs, rubs, or gallops  ABDOMEN: BSx4; Soft, nontender, nondistended  EXTREMITIES:  2+ Peripheral Pulses, brisk capillary refill. No clubbing, cyanosis, or edema  NERVOUS SYSTEM:  A&Ox3, no focal deficits   SKIN: No rashes or lesions

## 2024-01-30 NOTE — CONSULT NOTE ADULT - SUBJECTIVE AND OBJECTIVE BOX
Cardiologist:    HPI:      Cardiology Consult HPI:    PAST MEDICAL & SURGICAL HISTORY  Cervical cancer  30 years ago    H/O gastric sleeve    H/O:         FAMILY HISTORY:  FAMILY HISTORY:  Family history of myocardial infarction (Mother)      [ ] no pertinent family history of premature cardiovascular disease in first degree relatives.  Mother:   Father:   Siblings:     SOCIAL HISTORY:  []smoker  []Alcohol  []Drug    ALLERGIES:  No Known Allergies      MEDICATIONS:  MEDICATIONS  (STANDING):  digoxin  Injectable 500 MICROGram(s) IV Push Once  diltiazem Infusion 5 mG/Hr (5 mL/Hr) IV Continuous <Continuous>    MEDICATIONS  (PRN):      HOME MEDICATIONS:  Home Medications:  Vivelle-Dot 0.1 mg/24 hours twice weekly transdermal film, extended release: transdermal once a week (19 May 2022 10:20)      VITALS:   T(F): 97 ( @ 10:56), Max: 97 ( @ 10:56)  HR: 139 ( @ 13:49) (139 - 175)  BP: 103/62 ( @ 13:49) (103/62 - 132/62)  BP(mean): 72 ( @ 13:49) (72 - 72)  RR: 16 ( @ 13:49) (16 - 18)  SpO2: 99% ( @ 13:49) (99% - 99%)    I&O's Summary      REVIEW OF SYSTEMS:    Negative except as mentioned in HPI    CONSTITUTIONAL: No weakness, fevers or chills  EYES: No visual changes  ENT: No vertigo or throat pain   NECK: No pain or stiffness  RESPIRATORY: No cough, wheezing, hemoptysis; No shortness of breath  CARDIOVASCULAR: No chest pain or palpitations  GASTROINTESTINAL: No abdominal or epigastric pain. No nausea, vomiting, or hematemesis; No diarrhea or constipation. No melena or hematochezia.  GENITOURINARY: No dysuria, frequency or hematuria  NEUROLOGICAL: No numbness or weakness  SKIN: No itching, no rashes  MSK: FROM x4    PHYSICAL EXAM:  NEURO: Awake , alert and oriented  GEN: Not in acute distress  NECK: No JVD  LUNGS: Clear to auscultation bilaterally   CARDIOVASCULAR: S1/S2 present, RRR , no murmurs or rubs, no carotid bruits,  + PP bilaterally  ABD: Soft, non-tender, non-distended, +BS  EXT: No MIRANDA  SKIN: Intact    LABS:                        13.9   4.66  )-----------( 232      ( 2024 11:05 )             42.3         140  |  107  |  13  ----------------------------<  80  5.4<H>   |  23  |  0.8    Ca    8.6      2024 11:05  Mg     1.8         TPro  6.7  /  Alb  4.0  /  TBili  0.5  /  DBili  x   /  AST  34  /  ALT  14  /  AlkPhos  38                Troponin trend:          RADIOLOGY:  -CXR:  -TTE:  -CCTA:  -STRESS TEST:  -CATHETERIZATION:    ECG:    TELEMETRY EVENTS:   Cardiologist:    HPI:      Cardiology Consult HPI:  No prior cardiac history, presents with palpitations for 3 days , sustained since this AM.    PAST MEDICAL & SURGICAL HISTORY  Cervical cancer  30 years ago    H/O gastric sleeve    H/O:         FAMILY HISTORY:  FAMILY HISTORY:  Family history of myocardial infarction (Mother)      [ ] no pertinent family history of premature cardiovascular disease in first degree relatives.  Mother:   Father:   Siblings:     SOCIAL HISTORY:  []smoker  []Alcohol  []Drug    ALLERGIES:  No Known Allergies      MEDICATIONS:  MEDICATIONS  (STANDING):  digoxin  Injectable 500 MICROGram(s) IV Push Once  diltiazem Infusion 5 mG/Hr (5 mL/Hr) IV Continuous <Continuous>    MEDICATIONS  (PRN):      HOME MEDICATIONS:  Home Medications:  Vivelle-Dot 0.1 mg/24 hours twice weekly transdermal film, extended release: transdermal once a week (19 May 2022 10:20)      VITALS:   T(F): 97 ( @ 10:56), Max: 97 (01- @ 10:56)  HR: 139 ( @ 13:49) (139 - 175)  BP: 103/62 (- @ 13:49) (103/62 - 132/62)  BP(mean): 72 (- @ 13:49) (72 - 72)  RR: 16 ( @ 13:49) (16 - 18)  SpO2: 99% ( @ 13:49) (99% - 99%)    I&O's Summary      REVIEW OF SYSTEMS:    Negative except as mentioned in HPI    CONSTITUTIONAL: No weakness, fevers or chills  EYES: No visual changes  ENT: No vertigo or throat pain   NECK: No pain or stiffness  RESPIRATORY: No cough, wheezing, hemoptysis; No shortness of breath  CARDIOVASCULAR: No chest pain or palpitations  GASTROINTESTINAL: No abdominal or epigastric pain. No nausea, vomiting, or hematemesis; No diarrhea or constipation. No melena or hematochezia.  GENITOURINARY: No dysuria, frequency or hematuria  NEUROLOGICAL: No numbness or weakness  SKIN: No itching, no rashes  MSK: FROM x4    PHYSICAL EXAM:  NEURO: Awake , alert and oriented  GEN: Not in acute distress  NECK: No JVD  LUNGS: Clear to auscultation bilaterally   CARDIOVASCULAR: S1/S2 present, RRR , no murmurs or rubs, no carotid bruits,  + PP bilaterally  ABD: Soft, non-tender, non-distended, +BS  EXT: No MIRANDA  SKIN: Intact    LABS:                        13.9   4.66  )-----------( 232      ( 2024 11:05 )             42.3         140  |  107  |  13  ----------------------------<  80  5.4<H>   |  23  |  0.8    Ca    8.6      2024 11:05  Mg     1.8         TPro  6.7  /  Alb  4.0  /  TBili  0.5  /  DBili  x   /  AST  34  /  ALT  14  /  AlkPhos  38                Troponin trend:          RADIOLOGY:  -CXR:  -TTE:  -CCTA:  -STRESS TEST:  -CATHETERIZATION:    ECG:    TELEMETRY EVENTS:   Cardiologist:    HPI:  58 year old woman with no significant past medical history presents with palpitations for three days. They were intermittent but this morning, she started to feel them more. She denies chest pain, shortness of breath. No syncope. No leg swelling, orthopnea and PND. She denies flu like illness. She does not snore.       Cardiology Consult HPI:  No prior cardiac history, presents with palpitations for 3 days , sustained since this AM.    PAST MEDICAL & SURGICAL HISTORY  Cervical cancer  30 years ago    H/O gastric sleeve    H/O:         FAMILY HISTORY:  FAMILY HISTORY:  Family history of myocardial infarction (Mother)      [ ] no pertinent family history of premature cardiovascular disease in first degree relatives.  Mother:   Father:   Siblings:     SOCIAL HISTORY:  []smoker  []Alcohol  []Drug    ALLERGIES:  No Known Allergies      MEDICATIONS:  MEDICATIONS  (STANDING):  digoxin  Injectable 500 MICROGram(s) IV Push Once  diltiazem Infusion 5 mG/Hr (5 mL/Hr) IV Continuous <Continuous>    MEDICATIONS  (PRN):      HOME MEDICATIONS:  Home Medications:  Vivelle-Dot 0.1 mg/24 hours twice weekly transdermal film, extended release: transdermal once a week (19 May 2022 10:20)      VITALS:   T(F): 97 ( @ 10:56), Max: 97 ( @ 10:56)  HR: 139 ( @ 13:49) (139 - 175)  BP: 103/62 (- @ 13:49) (103/62 - 132/62)  BP(mean): 72 ( @ 13:49) (72 - 72)  RR: 16 ( @ 13:49) (16 - 18)  SpO2: 99% ( @ 13:49) (99% - 99%)    I&O's Summary      REVIEW OF SYSTEMS:    Negative except as mentioned in HPI    CONSTITUTIONAL: No weakness, fevers or chills  EYES: No visual changes  ENT: No vertigo or throat pain   NECK: No pain or stiffness  RESPIRATORY: No cough, wheezing, hemoptysis; No shortness of breath  CARDIOVASCULAR: No chest pain or palpitations  GASTROINTESTINAL: No abdominal or epigastric pain. No nausea, vomiting, or hematemesis; No diarrhea or constipation. No melena or hematochezia.  GENITOURINARY: No dysuria, frequency or hematuria  NEUROLOGICAL: No numbness or weakness  SKIN: No itching, no rashes  MSK: FROM x4    PHYSICAL EXAM:  NEURO: Awake , alert and oriented  GEN: Not in acute distress, pleasant  NECK: No JVD, supple  LUNGS: Clear to auscultation bilaterally   CARDIOVASCULAR: S1/S2 present, irr irr , no murmurs or rubs, no carotid bruits,  + PP bilaterally  ABD: Soft, non-tender, non-distended, +BS  EXT: No MIRANDA, warm  SKIN: Intact, rash    LABS:                        13.9   4.66  )-----------( 232      ( 2024 11:05 )             42.3         140  |  107  |  13  ----------------------------<  80  5.4<H>   |  23  |  0.8    Ca    8.6      2024 11:05  Mg     1.8         TPro  6.7  /  Alb  4.0  /  TBili  0.5  /  DBili  x   /  AST  34  /  ALT  14  /  AlkPhos  38                Troponin trend:          RADIOLOGY:  -CXR:  -TTE:  -CCTA:  -STRESS TEST:  -CATHETERIZATION:    ECG:    TELEMETRY EVENTS:

## 2024-01-30 NOTE — CONSULT NOTE ADULT - ASSESSMENT
59 yo F w no prior cardiac hisyory pw palpitations.     New AF w RVR on diltiazem gtt     -cardizem gtt   -digoxin load   -PEREZ-DCCV tomorrow   -check TSH   -start eliquis 5 mg BID  57 yo F w no prior cardiac history pw palpitations.     New AF w RVR on diltiazem gtt     -cont cardizem gtt   -digoxin load ,  mcg now followed by 250 mcg every 6 hours x 2   -PEREZ-DCCV tomorrow   -NPO past midnight   -check TSH   -start eliquis 5 mg BID

## 2024-01-30 NOTE — ED PROVIDER NOTE - OBJECTIVE STATEMENT
58-year-old female past medical history of cervical cancer on remission on estrogen patch, on weight loss medication injection as well as gastric sleeve coming in with complaints of palpitations.  Patient reports about 2 hours prior to arrival patient had some increased gas, felt bloated, and then started feeling palpitations with mild lightheadedness.  Patient called EMS where they noted she was in A-fib with RVR to 150-190, given Cardizem 20 IV with rate improved BP transiently dropping to 80s over 50s, now normal. Denies any fever, chills, nausea, vomiting, CP, SOB, changes in urination, or changes in bowel movements.

## 2024-01-30 NOTE — ED PROVIDER NOTE - PROGRESS NOTE DETAILS
SG: Patient assessed at bedside.  Patient given multiple pushes carvedilol, beta-blocker as well as 1 push of digoxin.  Patient resulting heart rate 90-1 10.  Stable.  To be admitted to [facility] telemetry.

## 2024-01-30 NOTE — ED PROVIDER NOTE - PHYSICAL EXAMINATION
CONSTITUTIONAL: NAD  SKIN: Warm dry  HEAD: NCAT  EYES: NL inspection  ENT: MMM  CARD: irregularly irregular  RESP: CTAB  ABD: Soft, non tender, no rebound or guarding   EXT: no pedal edema  NEURO: Grossly unremarkable  PSYCH: Cooperative, appropriate.

## 2024-01-30 NOTE — H&P ADULT - HISTORY OF PRESENT ILLNESS
58-year-old female past medical history of cervical cancer in remission presenting to ED for  palpitations.  Patient reports about 2 hours prior to arrival patient reported feeling bloated followed by  palpitations with mild lightheadedness.  Patient called EMS where they noted she was in A-fib with RVR to 150-190, given Cardizem 20 IV with rate improved BP transiently dropping to 80s over 50s en route to ED . Denies any fever, chills, nausea, vomiting, CP, SOB, changes in urination, or changes in bowel movements.    ED  Vital Signs  T(F): 97.2 (30 Jan 2024 16:50), Max: 97.2 (30 Jan 2024 16:50)  HR: 104 (30 Jan 2024 16:50) (104 - 175)  BP: 99/56 (30 Jan 2024 16:50) (99/56 - 132/62)  BP(mean): 72 (30 Jan 2024 13:49) (72 - 72)  RR: 16 (30 Jan 2024 16:50) (16 - 18)  SpO2: 99% (30 Jan 2024 16:50) (99% - 99%) on room air     EKG : Afib RVR          58-year-old female past medical history of cervical cancer in remission, gastric sleeve  presenting to ED for  palpitations.  Patient reports about 2 hours prior to arrival patient reported feeling bloated followed by  palpitations with mild lightheadedness.  Patient called EMS where they noted she was in A-fib with RVR to 150-190, given Cardizem 20 IV with rate improved BP transiently dropping to 80s over 50s en route to ED . Denies any fever, chills, nausea, vomiting, CP, SOB, changes in urination, or changes in bowel movements.    ED  Vital Signs  T(F): 97.2 (30 Jan 2024 16:50), Max: 97.2 (30 Jan 2024 16:50)  HR: 104 (30 Jan 2024 16:50) (104 - 175)  BP: 99/56 (30 Jan 2024 16:50) (99/56 - 132/62)  BP(mean): 72 (30 Jan 2024 13:49) (72 - 72)  RR: 16 (30 Jan 2024 16:50) (16 - 18)  SpO2: 99% (30 Jan 2024 16:50) (99% - 99%) on room air     EKG : Afib RVR

## 2024-01-30 NOTE — CONSULT NOTE ADULT - ATTENDING COMMENTS
Briefly, 58 year old man for whom cardiology is consulted for atrial fibrillation. This is the patient's first known episode. There are no obvious triggers. Her BP is tenuous so would continue diltiazem drip as much as possible and can do digoxin for additional rate control as above. Start eliquis for stroke ppx. PEREZ/DCCV tomorrow.     The procedure was discussed in detail with the patient, including the risks, benefits and alternatives. The patient verbalized understanding and wishes to proceed.

## 2024-01-31 ENCOUNTER — TRANSCRIPTION ENCOUNTER (OUTPATIENT)
Age: 59
End: 2024-01-31

## 2024-01-31 VITALS
DIASTOLIC BLOOD PRESSURE: 61 MMHG | SYSTOLIC BLOOD PRESSURE: 125 MMHG | HEART RATE: 60 BPM | OXYGEN SATURATION: 100 % | RESPIRATION RATE: 18 BRPM

## 2024-01-31 LAB
ALBUMIN SERPL ELPH-MCNC: 3.6 G/DL — SIGNIFICANT CHANGE UP (ref 3.5–5.2)
ALP SERPL-CCNC: 41 U/L — SIGNIFICANT CHANGE UP (ref 30–115)
ALT FLD-CCNC: 10 U/L — SIGNIFICANT CHANGE UP (ref 0–41)
ANION GAP SERPL CALC-SCNC: 11 MMOL/L — SIGNIFICANT CHANGE UP (ref 7–14)
AST SERPL-CCNC: 12 U/L — SIGNIFICANT CHANGE UP (ref 0–41)
BASOPHILS # BLD AUTO: 0.03 K/UL — SIGNIFICANT CHANGE UP (ref 0–0.2)
BASOPHILS NFR BLD AUTO: 0.6 % — SIGNIFICANT CHANGE UP (ref 0–1)
BILIRUB SERPL-MCNC: 0.7 MG/DL — SIGNIFICANT CHANGE UP (ref 0.2–1.2)
BUN SERPL-MCNC: 9 MG/DL — LOW (ref 10–20)
CALCIUM SERPL-MCNC: 8.6 MG/DL — SIGNIFICANT CHANGE UP (ref 8.4–10.5)
CHLORIDE SERPL-SCNC: 103 MMOL/L — SIGNIFICANT CHANGE UP (ref 98–110)
CO2 SERPL-SCNC: 24 MMOL/L — SIGNIFICANT CHANGE UP (ref 17–32)
CREAT SERPL-MCNC: 0.8 MG/DL — SIGNIFICANT CHANGE UP (ref 0.7–1.5)
EGFR: 85 ML/MIN/1.73M2 — SIGNIFICANT CHANGE UP
EOSINOPHIL # BLD AUTO: 0.04 K/UL — SIGNIFICANT CHANGE UP (ref 0–0.7)
EOSINOPHIL NFR BLD AUTO: 0.7 % — SIGNIFICANT CHANGE UP (ref 0–8)
GLUCOSE SERPL-MCNC: 76 MG/DL — SIGNIFICANT CHANGE UP (ref 70–99)
HCT VFR BLD CALC: 39.9 % — SIGNIFICANT CHANGE UP (ref 37–47)
HGB BLD-MCNC: 13.2 G/DL — SIGNIFICANT CHANGE UP (ref 12–16)
IMM GRANULOCYTES NFR BLD AUTO: 0.2 % — SIGNIFICANT CHANGE UP (ref 0.1–0.3)
LYMPHOCYTES # BLD AUTO: 1.67 K/UL — SIGNIFICANT CHANGE UP (ref 1.2–3.4)
LYMPHOCYTES # BLD AUTO: 30.6 % — SIGNIFICANT CHANGE UP (ref 20.5–51.1)
MAGNESIUM SERPL-MCNC: 2 MG/DL — SIGNIFICANT CHANGE UP (ref 1.8–2.4)
MCHC RBC-ENTMCNC: 31 PG — SIGNIFICANT CHANGE UP (ref 27–31)
MCHC RBC-ENTMCNC: 33.1 G/DL — SIGNIFICANT CHANGE UP (ref 32–37)
MCV RBC AUTO: 93.7 FL — SIGNIFICANT CHANGE UP (ref 81–99)
MONOCYTES # BLD AUTO: 0.45 K/UL — SIGNIFICANT CHANGE UP (ref 0.1–0.6)
MONOCYTES NFR BLD AUTO: 8.3 % — SIGNIFICANT CHANGE UP (ref 1.7–9.3)
NEUTROPHILS # BLD AUTO: 3.25 K/UL — SIGNIFICANT CHANGE UP (ref 1.4–6.5)
NEUTROPHILS NFR BLD AUTO: 59.6 % — SIGNIFICANT CHANGE UP (ref 42.2–75.2)
NRBC # BLD: 0 /100 WBCS — SIGNIFICANT CHANGE UP (ref 0–0)
PHOSPHATE SERPL-MCNC: 3.9 MG/DL — SIGNIFICANT CHANGE UP (ref 2.1–4.9)
PLATELET # BLD AUTO: 181 K/UL — SIGNIFICANT CHANGE UP (ref 130–400)
PMV BLD: 11.4 FL — HIGH (ref 7.4–10.4)
POTASSIUM SERPL-MCNC: 3.9 MMOL/L — SIGNIFICANT CHANGE UP (ref 3.5–5)
POTASSIUM SERPL-SCNC: 3.9 MMOL/L — SIGNIFICANT CHANGE UP (ref 3.5–5)
PROT SERPL-MCNC: 5.7 G/DL — LOW (ref 6–8)
RBC # BLD: 4.26 M/UL — SIGNIFICANT CHANGE UP (ref 4.2–5.4)
RBC # FLD: 13.8 % — SIGNIFICANT CHANGE UP (ref 11.5–14.5)
SODIUM SERPL-SCNC: 138 MMOL/L — SIGNIFICANT CHANGE UP (ref 135–146)
TSH SERPL-MCNC: 2.99 UIU/ML — SIGNIFICANT CHANGE UP (ref 0.27–4.2)
WBC # BLD: 5.45 K/UL — SIGNIFICANT CHANGE UP (ref 4.8–10.8)
WBC # FLD AUTO: 5.45 K/UL — SIGNIFICANT CHANGE UP (ref 4.8–10.8)

## 2024-01-31 PROCEDURE — 99239 HOSP IP/OBS DSCHRG MGMT >30: CPT

## 2024-01-31 RX ORDER — DILTIAZEM HCL 120 MG
60 CAPSULE, EXT RELEASE 24 HR ORAL EVERY 8 HOURS
Refills: 0 | Status: DISCONTINUED | OUTPATIENT
Start: 2024-01-31 | End: 2024-01-31

## 2024-01-31 RX ORDER — DILTIAZEM HCL 120 MG
1 CAPSULE, EXT RELEASE 24 HR ORAL
Qty: 120 | Refills: 1
Start: 2024-01-31 | End: 2024-03-30

## 2024-01-31 RX ORDER — APIXABAN 2.5 MG/1
1 TABLET, FILM COATED ORAL
Qty: 60 | Refills: 0
Start: 2024-01-31 | End: 2024-02-29

## 2024-01-31 RX ORDER — DILTIAZEM HCL 120 MG
30 CAPSULE, EXT RELEASE 24 HR ORAL EVERY 8 HOURS
Refills: 0 | Status: DISCONTINUED | OUTPATIENT
Start: 2024-01-31 | End: 2024-01-31

## 2024-01-31 RX ORDER — DILTIAZEM HCL 120 MG
30 CAPSULE, EXT RELEASE 24 HR ORAL EVERY 6 HOURS
Refills: 0 | Status: DISCONTINUED | OUTPATIENT
Start: 2024-01-31 | End: 2024-01-31

## 2024-01-31 RX ADMIN — Medication 60 MILLIGRAM(S): at 05:44

## 2024-01-31 RX ADMIN — APIXABAN 5 MILLIGRAM(S): 2.5 TABLET, FILM COATED ORAL at 05:44

## 2024-01-31 RX ADMIN — Medication 60 MILLIGRAM(S): at 01:16

## 2024-01-31 NOTE — DISCHARGE NOTE PROVIDER - CARE PROVIDER_API CALL
Nebulizer machine  Prednisone sent  Advised to quit smoking   Follow up in 4 months
Felton Bridges  [address] [address]  Phone: [phone]  Fax: [phone]  Follow Up Time: 2 weeks

## 2024-01-31 NOTE — DISCHARGE NOTE PROVIDER - HOSPITAL COURSE
HPI:   58-year-old female past medical history of cervical cancer in remission, gastric sleeve  presenting to ED for  palpitations.  Patient reports about 2 hours prior to arrival patient reported feeling bloated followed by  palpitations with mild lightheadedness.  Patient called EMS where they noted she was in A-fib with RVR to 150-190, given Cardizem 20 IV with rate improved BP transiently dropping to 80s over 50s en route to ED . Denies any fever, chills, nausea, vomiting, CP, SOB, changes in urination, or changes in bowel movements.    ED course:   T 97.2, , BP 99/56, RR16, SpO2 99%   EKG : Afib RVR     Hospital course:   Patient was admitted to medicine for new onset afib w/ RVR. Started on cardizem drip, HR remained above 140s. Patient was planned for DCCV in the AM. Loaded with 500mg IV digoxin and converted to NSR with HR in the 50s-60s. Cardizem drip transitioned to oral cardizem. Patient was receiving Eliquis while inpatient, CHADSVASc of 1 so AC is not needed. Will discharge on PO 30mg q6h of cardizem and no anticoagulation. Patient is aware that she needs to follow up with her cardiologist, Dr. Montoya. HPI:   58-year-old female past medical history of cervical cancer in remission, gastric sleeve  presenting to ED for  palpitations.  Patient reports about 2 hours prior to arrival patient reported feeling bloated followed by  palpitations with mild lightheadedness.  Patient called EMS where they noted she was in A-fib with RVR to 150-190, given Cardizem 20 IV with rate improved BP transiently dropping to 80s over 50s en route to ED . Denies any fever, chills, nausea, vomiting, CP, SOB, changes in urination, or changes in bowel movements.    ED course:   T 97.2, , BP 99/56, RR16, SpO2 99%   EKG : Afib RVR     Hospital course:   Patient was admitted to medicine for new onset afib w/ RVR. Started on cardizem drip, HR remained above 140s. Patient was planned for DCCV in the AM. Loaded with 500mg IV digoxin and converted to NSR with HR in the 50s-60s. Cardizem drip transitioned to oral cardizem. Patient persistently bradycardic while on oral cardizem, will hold further rate control at discharge. Patient was receiving Eliquis while inpatient, CHADSVASc of 1, will continue at discharge per cardiology. Patient has appointment with cardiologist, Dr. Montoya, on 2/2/2024.

## 2024-01-31 NOTE — DISCHARGE NOTE PROVIDER - NSDCMRMEDTOKEN_GEN_ALL_CORE_FT
dilTIAZem 30 mg oral tablet: 1 tab(s) orally every 6 hours  tirzepatide 5 mg/0.5 mL subcutaneous solution: 5 milligram(s) subcutaneously once a week  Vivelle-Dot 0.1 mg/24 hours twice weekly transdermal film, extended release: transdermal once a week   apixaban 5 mg oral tablet: 1 tab(s) orally every 12 hours  tirzepatide 5 mg/0.5 mL subcutaneous solution: 5 milligram(s) subcutaneously once a week  Vivelle-Dot 0.1 mg/24 hours twice weekly transdermal film, extended release: transdermal once a week

## 2024-01-31 NOTE — DISCHARGE NOTE PROVIDER - ATTENDING DISCHARGE PHYSICAL EXAMINATION:
Gen- middle-age F, NAD, non toxic appearing  Eyes- anicteric sclera, non injected conjunctiva, EOMI  ENT- hearing grossly intact, oropharynx clear  Chest- symmetrical chest rise, no accessory muscle use  Cardiac- bradycardic, normal rhythm  Abdominal- non distended  Ext- spontaneously moving all 4 ext against gravity  Psych- pleasant and interactive

## 2024-01-31 NOTE — DISCHARGE NOTE PROVIDER - NSDCCPCAREPLAN_GEN_ALL_CORE_FT
PRINCIPAL DISCHARGE DIAGNOSIS  Diagnosis: Atrial fibrillation with RVR  Assessment and Plan of Treatment: You were admitted to the hospital for heart palpitations. You had an EKG that demonstrated atrial fibrillation. Atrial fibrillation is a type of irregular heartbeat (arrhythmia) where the heart quivers continuously in a chaotic pattern that makes the heart unable to pump blood normally. This can increase the risk for stroke, heart failure, and other heart-related conditions. Atrial fibrillation can be caused by a variety of conditions and may be temporary, intermittent, or permanent. Symptoms include feeling that your heart is beating rapidly or irregularly, chest discomfort, shortness of breath, or dizziness/lightheadedness that may be worse with exertion. You were treated with an IV antiarrythmic and successfully converted back into sinus rhythm. You will be discharged on oral medication to help control your heart rate. Please take all medication exactly as prescribed. Follow up with your cardiologist as an outpatient.   SEEK IMMEDIATE MEDICAL CARE IF YOU HAVE ANY OF THE FOLLOWING SYMPTOMS: chest pain, shortness of breath, abdominal pain, sweating, vomiting, blood in vomit/bowel movements/urine, dizziness/lightheadedness, weakness or numbness to face/arm/leg, trouble speaking or understanding, facial droop.

## 2024-01-31 NOTE — DISCHARGE NOTE PROVIDER - PROVIDER TOKENS
Ok per VO Dr. Pineda to refill Fluticasone Propionate 220mcg/act x 12g.  Rx sent to  [facility].     PROVIDER:[TOKEN:[[id number]],FOLLOWUP:[2 weeks]]

## 2024-01-31 NOTE — CHART NOTE - NSCHARTNOTEFT_GEN_A_CORE
Cardizem drip discontinued as patient is in NSR with HR of 50-60bpm. Switched to oral Cardizem 60mg Q8H 1st dose stat. Digoxin discontinued.
Pt spontaneously converted to sinus rhythm with HR ranging in 50s. Has f/u with cardiologist on 2/2 per team.     Okay to discharge off cardizem or any rate control agents as she is bradycardic and can be resumed on follow up with cardiology in 2 days.   Continue eliquis for 1 month post cardioversion.   TTE can be done outpatient.

## 2024-02-02 ENCOUNTER — APPOINTMENT (OUTPATIENT)
Dept: CARDIOLOGY | Facility: CLINIC | Age: 59
End: 2024-02-02
Payer: COMMERCIAL

## 2024-02-02 VITALS — WEIGHT: 174 LBS | BODY MASS INDEX: 28.99 KG/M2 | HEIGHT: 65 IN

## 2024-02-02 DIAGNOSIS — Z00.00 ENCOUNTER FOR GENERAL ADULT MEDICAL EXAMINATION W/OUT ABNORMAL FINDINGS: ICD-10-CM

## 2024-02-02 PROCEDURE — 99214 OFFICE O/P EST MOD 30 MIN: CPT | Mod: 25

## 2024-02-02 PROCEDURE — 99204 OFFICE O/P NEW MOD 45 MIN: CPT | Mod: 25

## 2024-02-02 PROCEDURE — 93000 ELECTROCARDIOGRAM COMPLETE: CPT

## 2024-02-02 RX ORDER — PROPAFENONE HYDROCHLORIDE 300 MG/1
300 TABLET, FILM COATED ORAL EVERY 8 HOURS
Qty: 90 | Refills: 0 | Status: ACTIVE | COMMUNITY
Start: 2024-02-02 | End: 1900-01-01

## 2024-02-05 DIAGNOSIS — I48.91 UNSPECIFIED ATRIAL FIBRILLATION: ICD-10-CM

## 2024-02-05 DIAGNOSIS — C53.9 MALIGNANT NEOPLASM OF CERVIX UTERI, UNSPECIFIED: ICD-10-CM

## 2024-02-05 DIAGNOSIS — Z98.84 BARIATRIC SURGERY STATUS: ICD-10-CM

## 2024-02-28 LAB
ALBUMIN SERPL ELPH-MCNC: 4 G/DL
ALP BLD-CCNC: 46 U/L
ALT SERPL-CCNC: 16 U/L
ANION GAP SERPL CALC-SCNC: 12 MMOL/L
AST SERPL-CCNC: 22 U/L
BILIRUB SERPL-MCNC: 0.9 MG/DL
BUN SERPL-MCNC: 13 MG/DL
CALCIUM SERPL-MCNC: 9.1 MG/DL
CHLORIDE SERPL-SCNC: 103 MMOL/L
CHOLEST SERPL-MCNC: 172 MG/DL
CO2 SERPL-SCNC: 26 MMOL/L
CREAT SERPL-MCNC: 0.94 MG/DL
EGFR: 70 ML/MIN/1.73M2
ESTIMATED AVERAGE GLUCOSE: 91 MG/DL
GLUCOSE SERPL-MCNC: 77 MG/DL
HBA1C MFR BLD HPLC: 4.8 %
HCT VFR BLD CALC: 44.9 %
HDLC SERPL-MCNC: 74 MG/DL
HGB BLD-MCNC: 14.2 G/DL
LDLC SERPL CALC-MCNC: 83 MG/DL
MCHC RBC-ENTMCNC: 30.7 PG
MCHC RBC-ENTMCNC: 31.6 GM/DL
MCV RBC AUTO: 97 FL
NONHDLC SERPL-MCNC: 98 MG/DL
NT-PROBNP SERPL-MCNC: 141 PG/ML
PLATELET # BLD AUTO: 175 K/UL
POTASSIUM SERPL-SCNC: 4.7 MMOL/L
PROT SERPL-MCNC: 7 G/DL
RBC # BLD: 4.63 M/UL
RBC # FLD: 14.2 %
SODIUM SERPL-SCNC: 140 MMOL/L
TRIGL SERPL-MCNC: 82 MG/DL
TSH SERPL-ACNC: 4.77 UIU/ML
WBC # FLD AUTO: 3.68 K/UL

## 2024-02-28 NOTE — HISTORY OF PRESENT ILLNESS
[FreeTextEntry1] : Pt wtih ELPIDIO with CPAP in past prior to weight loss, gastric sleeve  and lost weight (lost 140 lbs), NEW ONSET AFIB IN  was in hospital  pt feels afib.  pt had high hr to 137 and not d/c'ed on meds as low hr.  pt drank vodka and started tirzapetide.  pt had no cp or sob with afib. pt has h/o anxiety.   potassium 3.9, pt only had limited echo with no mention LAE.   3/8/24:  24: T, HDL: 74, LDL: 83, BNP: 141, TSH: 4.77

## 2024-02-28 NOTE — PHYSICAL EXAM
[Normal Venous Pressure] : normal venous pressure [Normal S1, S2] : normal S1, S2 [Clear Lung Fields] : clear lung fields [Soft] : abdomen soft [No Edema] : no edema [de-identified] : RRR

## 2024-02-28 NOTE — DISCUSSION/SUMMARY
[EKG obtained to assist in diagnosis and management of assessed problem(s)] : EKG obtained to assist in diagnosis and management of assessed problem(s) [FreeTextEntry1] : Patient's  called office stating patient is in [facility] with AFIB. Patient advised to follow up outpatient after discharge from hospital.  pt to get pill in a pocker  chads vasc 1 continue eliquis for now  get 2 d echo  avoid stress  get yan w/u.

## 2024-03-08 ENCOUNTER — APPOINTMENT (OUTPATIENT)
Dept: CARDIOLOGY | Facility: CLINIC | Age: 59
End: 2024-03-08
Payer: COMMERCIAL

## 2024-03-08 VITALS — DIASTOLIC BLOOD PRESSURE: 70 MMHG | SYSTOLIC BLOOD PRESSURE: 120 MMHG | HEART RATE: 70 BPM

## 2024-03-08 VITALS — BODY MASS INDEX: 28.32 KG/M2 | HEIGHT: 65 IN | WEIGHT: 170 LBS

## 2024-03-08 PROCEDURE — 99214 OFFICE O/P EST MOD 30 MIN: CPT

## 2024-03-08 PROCEDURE — 93306 TTE W/DOPPLER COMPLETE: CPT

## 2024-03-08 PROCEDURE — ZZZZZ: CPT

## 2024-03-08 NOTE — HISTORY OF PRESENT ILLNESS
[FreeTextEntry1] : Pt wtih NEW ONSET AFIB PAF IN  was in hospital, ELPIDIO with CPAP in past prior to weight loss, gastric sleeve  and lost weight (lost 140 lbs),   pt feels afib.  pt had high hr to 137 and not d/c'ed on meds as low hr prior to d/c  pt drank vodka and started tirzapetide.  pt had no cp or sob with afib. pt has h/o anxiety.  potassium 3.9, pt only had limited echo with no mention LAE.   3/8/24:  Echo: lvef 60%, e' sept: 0.12 m/s, normal atrium: LAESVI: 23 ml/m2  24: T, HDL: 74, LDL: 83, BNP: 141, TSH: 4.77 potassium 4.7  Patient denies cp, sob, dizziness/syncope. Patient has not had anymore palpitations.  pt thyroid elevated a little bit.

## 2024-03-08 NOTE — PHYSICAL EXAM
[Normal Venous Pressure] : normal venous pressure [Normal S1, S2] : normal S1, S2 [Clear Lung Fields] : clear lung fields [Soft] : abdomen soft [No Edema] : no edema [de-identified] : RRR

## 2024-03-08 NOTE — DISCUSSION/SUMMARY
[FreeTextEntry1] : Patient's  called office stating patient is in [facility] with AFIB.  chads vasc 1 continue Eliquis for now  given pill in a pocket as patient feels afib (take 3 propafenone pills if sense afib) if not improved to sinus and tachycardia go to hospital avoid stress, caffeine, low potassium  get yan w/u.  Patient to f/u with PCP for thyroid.  get monitor and repeat tsh for 2 months to decide on Eliquis may stop next visit.  Detail Level: Generalized Detail Level: Zone Moisturizer Recommendations: La-Roche Posay Toleriane Line Detail Level: Simple

## 2024-04-22 ENCOUNTER — NON-APPOINTMENT (OUTPATIENT)
Age: 59
End: 2024-04-22

## 2024-05-05 ENCOUNTER — OUTPATIENT (OUTPATIENT)
Dept: OUTPATIENT SERVICES | Facility: HOSPITAL | Age: 59
LOS: 1 days | End: 2024-05-05
Payer: COMMERCIAL

## 2024-05-05 DIAGNOSIS — E04.1 NONTOXIC SINGLE THYROID NODULE: ICD-10-CM

## 2024-05-05 DIAGNOSIS — Z90.3 ACQUIRED ABSENCE OF STOMACH [PART OF]: Chronic | ICD-10-CM

## 2024-05-05 DIAGNOSIS — Z00.8 ENCOUNTER FOR OTHER GENERAL EXAMINATION: ICD-10-CM

## 2024-05-05 DIAGNOSIS — Z98.891 HISTORY OF UTERINE SCAR FROM PREVIOUS SURGERY: Chronic | ICD-10-CM

## 2024-05-05 PROCEDURE — 76536 US EXAM OF HEAD AND NECK: CPT | Mod: 26

## 2024-05-05 PROCEDURE — 76536 US EXAM OF HEAD AND NECK: CPT

## 2024-05-06 DIAGNOSIS — E04.1 NONTOXIC SINGLE THYROID NODULE: ICD-10-CM

## 2024-05-09 ENCOUNTER — APPOINTMENT (OUTPATIENT)
Dept: CARDIOLOGY | Facility: CLINIC | Age: 59
End: 2024-05-09
Payer: COMMERCIAL

## 2024-05-09 VITALS — SYSTOLIC BLOOD PRESSURE: 108 MMHG | DIASTOLIC BLOOD PRESSURE: 70 MMHG | HEART RATE: 70 BPM

## 2024-05-09 VITALS — HEIGHT: 65 IN | WEIGHT: 170 LBS | BODY MASS INDEX: 28.32 KG/M2

## 2024-05-09 DIAGNOSIS — I48.0 PAROXYSMAL ATRIAL FIBRILLATION: ICD-10-CM

## 2024-05-09 DIAGNOSIS — E66.9 OBESITY, UNSPECIFIED: ICD-10-CM

## 2024-05-09 DIAGNOSIS — G47.33 OBSTRUCTIVE SLEEP APNEA (ADULT) (PEDIATRIC): ICD-10-CM

## 2024-05-09 PROCEDURE — G2211 COMPLEX E/M VISIT ADD ON: CPT

## 2024-05-09 PROCEDURE — 99214 OFFICE O/P EST MOD 30 MIN: CPT

## 2024-05-09 RX ORDER — APIXABAN 5 MG/1
5 TABLET, FILM COATED ORAL
Qty: 180 | Refills: 0 | Status: DISCONTINUED | COMMUNITY
End: 2024-05-09

## 2024-05-09 NOTE — HISTORY OF PRESENT ILLNESS
[FreeTextEntry1] : Pt with NEW ONSET AFIB PAF IN  was in hospital, Anxiety, ELPIDIO with CPAP in past prior to weight loss, gastric sleeve  (lost 140 lbs),   PT FEELS AFIB..  pt had high hr to 137 and not d/c'ed on meds from hospital as low hr prior to d/c  pt drank vodka and started tirzapetide recently  pt had no cp or sob with afib. pt has h/o anxiety.  potassium 3.9, pt only had limited echo with no mention LAE.   3/8/24:  Echo: lvef 60%, e' sept: 0.12 m/s, normal atrium: LAESVI: 23 ml/m2  GLS: -20.6%  24: T, HDL: 74, LDL: 83, BNP: 141, TSH: 4.77 potassium 4.7  Patient denies cp, sob, dizziness/syncope. Patient has not had anymore palpitations.  pt thyroid elevated a little bit.   24: 3/24: MCOT: avg HR: 65bpm, PVCs: 1% no AFIB, pt has not felt any episodes.  possibly stop eliquis as no events on monitor  pt thyroid nodule f/u with endocrine and started on synthorid.  pt has no episodes of afib.

## 2024-05-09 NOTE — PHYSICAL EXAM
[Normal Venous Pressure] : normal venous pressure [Normal S1, S2] : normal S1, S2 [Clear Lung Fields] : clear lung fields [Soft] : abdomen soft [No Edema] : no edema [de-identified] : RRR

## 2024-05-09 NOTE — DISCUSSION/SUMMARY
[FreeTextEntry1] : pt was in [facility] with AFIB.  chads vasc 1 given pill in a pocket as patient feels afib (take 3 propafenone pills if senses afib) avoid stress, caffeine, low potassium  get yan w/u d/w patient at length as can potentiate afib  Patient to f/u with PCP for thyroid.  pt mcot 2024 no afib and senses when afib happens  consider apple watch.  STOP ELIQUIS 5/24 if afib recurs to call and restart  f/u in 6 months get bloodwork

## 2024-05-22 ENCOUNTER — APPOINTMENT (OUTPATIENT)
Dept: PULMONOLOGY | Facility: CLINIC | Age: 59
End: 2024-05-22

## 2024-07-10 ENCOUNTER — OUTPATIENT (OUTPATIENT)
Dept: OUTPATIENT SERVICES | Facility: HOSPITAL | Age: 59
LOS: 1 days | End: 2024-07-10
Payer: COMMERCIAL

## 2024-07-10 DIAGNOSIS — Z98.891 HISTORY OF UTERINE SCAR FROM PREVIOUS SURGERY: Chronic | ICD-10-CM

## 2024-07-10 DIAGNOSIS — Z12.31 ENCOUNTER FOR SCREENING MAMMOGRAM FOR MALIGNANT NEOPLASM OF BREAST: ICD-10-CM

## 2024-07-10 DIAGNOSIS — Z90.3 ACQUIRED ABSENCE OF STOMACH [PART OF]: Chronic | ICD-10-CM

## 2024-07-10 PROCEDURE — 77063 BREAST TOMOSYNTHESIS BI: CPT | Mod: 26

## 2024-07-10 PROCEDURE — 77063 BREAST TOMOSYNTHESIS BI: CPT

## 2024-07-10 PROCEDURE — 77067 SCR MAMMO BI INCL CAD: CPT

## 2024-07-10 PROCEDURE — 77067 SCR MAMMO BI INCL CAD: CPT | Mod: 26

## 2024-07-11 DIAGNOSIS — Z12.31 ENCOUNTER FOR SCREENING MAMMOGRAM FOR MALIGNANT NEOPLASM OF BREAST: ICD-10-CM

## 2024-08-15 ENCOUNTER — EMERGENCY (EMERGENCY)
Facility: HOSPITAL | Age: 59
LOS: 0 days | Discharge: HOME | End: 2024-08-16
Attending: EMERGENCY MEDICINE
Payer: COMMERCIAL

## 2024-08-15 VITALS
OXYGEN SATURATION: 98 % | SYSTOLIC BLOOD PRESSURE: 148 MMHG | RESPIRATION RATE: 18 BRPM | DIASTOLIC BLOOD PRESSURE: 84 MMHG | WEIGHT: 167.99 LBS | HEART RATE: 64 BPM | TEMPERATURE: 98 F

## 2024-08-15 DIAGNOSIS — Z98.891 HISTORY OF UTERINE SCAR FROM PREVIOUS SURGERY: Chronic | ICD-10-CM

## 2024-08-15 DIAGNOSIS — Z90.3 ACQUIRED ABSENCE OF STOMACH [PART OF]: Chronic | ICD-10-CM

## 2024-08-15 LAB
ALBUMIN SERPL ELPH-MCNC: 4.3 G/DL — SIGNIFICANT CHANGE UP (ref 3.5–5.2)
ALP SERPL-CCNC: 44 U/L — SIGNIFICANT CHANGE UP (ref 30–115)
ALT FLD-CCNC: 19 U/L — SIGNIFICANT CHANGE UP (ref 0–41)
ANION GAP SERPL CALC-SCNC: 9 MMOL/L — SIGNIFICANT CHANGE UP (ref 7–14)
AST SERPL-CCNC: 18 U/L — SIGNIFICANT CHANGE UP (ref 0–41)
BASOPHILS # BLD AUTO: 0.03 K/UL — SIGNIFICANT CHANGE UP (ref 0–0.2)
BASOPHILS NFR BLD AUTO: 0.7 % — SIGNIFICANT CHANGE UP (ref 0–1)
BILIRUB SERPL-MCNC: 0.5 MG/DL — SIGNIFICANT CHANGE UP (ref 0.2–1.2)
BUN SERPL-MCNC: 24 MG/DL — HIGH (ref 10–20)
CALCIUM SERPL-MCNC: 9.8 MG/DL — SIGNIFICANT CHANGE UP (ref 8.4–10.4)
CHLORIDE SERPL-SCNC: 106 MMOL/L — SIGNIFICANT CHANGE UP (ref 98–110)
CO2 SERPL-SCNC: 28 MMOL/L — SIGNIFICANT CHANGE UP (ref 17–32)
CREAT SERPL-MCNC: 1 MG/DL — SIGNIFICANT CHANGE UP (ref 0.7–1.5)
D DIMER BLD IA.RAPID-MCNC: <150 NG/ML DDU — SIGNIFICANT CHANGE UP
EGFR: 65 ML/MIN/1.73M2 — SIGNIFICANT CHANGE UP
EOSINOPHIL # BLD AUTO: 0.02 K/UL — SIGNIFICANT CHANGE UP (ref 0–0.7)
EOSINOPHIL NFR BLD AUTO: 0.4 % — SIGNIFICANT CHANGE UP (ref 0–8)
GLUCOSE SERPL-MCNC: 84 MG/DL — SIGNIFICANT CHANGE UP (ref 70–99)
HCT VFR BLD CALC: 41.3 % — SIGNIFICANT CHANGE UP (ref 37–47)
HGB BLD-MCNC: 13.5 G/DL — SIGNIFICANT CHANGE UP (ref 12–16)
IMM GRANULOCYTES NFR BLD AUTO: 0.2 % — SIGNIFICANT CHANGE UP (ref 0.1–0.3)
LYMPHOCYTES # BLD AUTO: 1.55 K/UL — SIGNIFICANT CHANGE UP (ref 1.2–3.4)
LYMPHOCYTES # BLD AUTO: 33.8 % — SIGNIFICANT CHANGE UP (ref 20.5–51.1)
MCHC RBC-ENTMCNC: 30.5 PG — SIGNIFICANT CHANGE UP (ref 27–31)
MCHC RBC-ENTMCNC: 32.7 G/DL — SIGNIFICANT CHANGE UP (ref 32–37)
MCV RBC AUTO: 93.4 FL — SIGNIFICANT CHANGE UP (ref 81–99)
MONOCYTES # BLD AUTO: 0.42 K/UL — SIGNIFICANT CHANGE UP (ref 0.1–0.6)
MONOCYTES NFR BLD AUTO: 9.2 % — SIGNIFICANT CHANGE UP (ref 1.7–9.3)
NEUTROPHILS # BLD AUTO: 2.56 K/UL — SIGNIFICANT CHANGE UP (ref 1.4–6.5)
NEUTROPHILS NFR BLD AUTO: 55.7 % — SIGNIFICANT CHANGE UP (ref 42.2–75.2)
NRBC # BLD: 0 /100 WBCS — SIGNIFICANT CHANGE UP (ref 0–0)
NT-PROBNP SERPL-SCNC: 38 PG/ML — SIGNIFICANT CHANGE UP (ref 0–300)
PLATELET # BLD AUTO: 179 K/UL — SIGNIFICANT CHANGE UP (ref 130–400)
PMV BLD: 11.5 FL — HIGH (ref 7.4–10.4)
POTASSIUM SERPL-MCNC: 5.3 MMOL/L — HIGH (ref 3.5–5)
POTASSIUM SERPL-SCNC: 5.3 MMOL/L — HIGH (ref 3.5–5)
PROT SERPL-MCNC: 6.4 G/DL — SIGNIFICANT CHANGE UP (ref 6–8)
RBC # BLD: 4.42 M/UL — SIGNIFICANT CHANGE UP (ref 4.2–5.4)
RBC # FLD: 13 % — SIGNIFICANT CHANGE UP (ref 11.5–14.5)
SODIUM SERPL-SCNC: 143 MMOL/L — SIGNIFICANT CHANGE UP (ref 135–146)
TROPONIN T, HIGH SENSITIVITY RESULT: <6 NG/L — SIGNIFICANT CHANGE UP (ref 6–13)
TROPONIN T, HIGH SENSITIVITY RESULT: <6 NG/L — SIGNIFICANT CHANGE UP (ref 6–13)
WBC # BLD: 4.59 K/UL — LOW (ref 4.8–10.8)
WBC # FLD AUTO: 4.59 K/UL — LOW (ref 4.8–10.8)

## 2024-08-15 PROCEDURE — 71046 X-RAY EXAM CHEST 2 VIEWS: CPT

## 2024-08-15 PROCEDURE — 80053 COMPREHEN METABOLIC PANEL: CPT

## 2024-08-15 PROCEDURE — 71046 X-RAY EXAM CHEST 2 VIEWS: CPT | Mod: 26

## 2024-08-15 PROCEDURE — 36415 COLL VENOUS BLD VENIPUNCTURE: CPT

## 2024-08-15 PROCEDURE — 75574 CT ANGIO HRT W/3D IMAGE: CPT | Mod: MC

## 2024-08-15 PROCEDURE — 96374 THER/PROPH/DIAG INJ IV PUSH: CPT

## 2024-08-15 PROCEDURE — 99223 1ST HOSP IP/OBS HIGH 75: CPT

## 2024-08-15 PROCEDURE — 83880 ASSAY OF NATRIURETIC PEPTIDE: CPT

## 2024-08-15 PROCEDURE — 85379 FIBRIN DEGRADATION QUANT: CPT

## 2024-08-15 PROCEDURE — 85025 COMPLETE CBC W/AUTO DIFF WBC: CPT

## 2024-08-15 PROCEDURE — 99284 EMERGENCY DEPT VISIT MOD MDM: CPT

## 2024-08-15 PROCEDURE — 84484 ASSAY OF TROPONIN QUANT: CPT

## 2024-08-15 PROCEDURE — 93005 ELECTROCARDIOGRAM TRACING: CPT

## 2024-08-15 PROCEDURE — G0378: CPT

## 2024-08-15 PROCEDURE — 93010 ELECTROCARDIOGRAM REPORT: CPT

## 2024-08-15 RX ORDER — ASPIRIN 500 MG
325 TABLET ORAL ONCE
Refills: 0 | Status: COMPLETED | OUTPATIENT
Start: 2024-08-15 | End: 2024-08-15

## 2024-08-15 RX ORDER — DEXTROSE MONOHYDRATE, SODIUM CHLORIDE, SODIUM LACTATE, CALCIUM CHLORIDE, MAGNESIUM CHLORIDE 1.5; 538; 448; 18.4; 5.08 G/100ML; MG/100ML; MG/100ML; MG/100ML; MG/100ML
1000 SOLUTION INTRAPERITONEAL ONCE
Refills: 0 | Status: COMPLETED | OUTPATIENT
Start: 2024-08-15 | End: 2024-08-15

## 2024-08-15 RX ADMIN — Medication 325 MILLIGRAM(S): at 21:23

## 2024-08-15 RX ADMIN — DEXTROSE MONOHYDRATE, SODIUM CHLORIDE, SODIUM LACTATE, CALCIUM CHLORIDE, MAGNESIUM CHLORIDE 1000 MILLILITER(S): 1.5; 538; 448; 18.4; 5.08 SOLUTION INTRAPERITONEAL at 18:50

## 2024-08-15 NOTE — ED CDU PROVIDER INITIAL DAY NOTE - ATTENDING APP SHARED VISIT CONTRIBUTION OF CARE
I personally evaluated the patient. I reviewed the Resident’s or Physician Assistant’s note (as assigned above), and agree with the findings and plan except as documented in my note.     see MDM

## 2024-08-15 NOTE — ED PROVIDER NOTE - ATTENDING APP SHARED VISIT CONTRIBUTION OF CARE
I personally evaluated the patient. I reviewed the Resident's or Physician Assistant's note (as assigned above), and agree with the findings and plan except as documented in my note.    59-year-old female presents to the emergency department complaining of intermittent chest pain and palpitations.  Prior history of atrial fibrillation, was on anticoagulation at the time but then taken off by her PMD cardiologist for having Holter monitor revealing no runs of atrial fibrillation.  She restarted her anticoagulation on the advice of her PMD with recent new chest symptoms, and noted palpitations which she was advised to come to the ED.  There are no other anginal equivalents.  No recent cardiac testing.    GENERAL: female in no distress.   HEENT: EOMI   CHEST: normal work of breathing noted  CV: pulses intact   EXTR: FROM   NEURO: AAO 3 no focal deficits  SKIN: normal no pallor   PSYCH: normal mood & mentation      Impression: Chest pain, palpitations    Plan: IV, labs, imaging, supportive care & reevaluation

## 2024-08-15 NOTE — ED PROVIDER NOTE - OBJECTIVE STATEMENT
59 year old female, past medical history afib on xarelto, hypothyroidism, who presents with chest pain. patient reports intermittent episodes of left sided chest pain described as tightness, that began x1 month ago, constant today prompting ed eval. patient reports pain worse with inspiration, non-exertional. patient diagnosed with afib 1/2024 started on eliquis, however, given no further episodes was recommended to stop eliquis. patient began having palpitations x1 week ago, restarted eliquis @ that time. denies f/c, cough, uri symptoms, nausea/vomiting, abd pain, syncope. former smoker. patient follows with dr de guzman, cardiologist.

## 2024-08-15 NOTE — ED PROVIDER NOTE - CLINICAL SUMMARY MEDICAL DECISION MAKING FREE TEXT BOX
59-year-old female presents to the emergency department for concern of paroxysmal atrial fibrillation and new chest pain.  Has no cardiac biometric devices or recent cardiac testing, remote history of atrial fibrillation self resolved and no longer taking anticoagulants.    In the emergency department had screening exam, labs and imaging, will dispo to ED observation unit for concerns of undiagnosed paroxysmal atrial fibrillation and provocative cardiac testing in the a.m..  At the time of disposition the results of the workup and plan explained to patient who expresses understanding.

## 2024-08-15 NOTE — ED ADULT TRIAGE NOTE - CHIEF COMPLAINT QUOTE
pt BIBA from home due to chest tightness. pt dx with a-fib 4months ago and is on ELIQUIS. pt given 324 aspirin in route by EMS. Iv access 20g R AC/

## 2024-08-15 NOTE — ED ADULT NURSE NOTE - OBJECTIVE STATEMENT
Patient BIBA from home due to chest tightness. pt dx with a-fib 4months ago and is on ELIQUIS. pt given 324 aspirin in route by EMS. Iv access 20g R AC. Patient reports non radiating chest tightness that started yesterday. Pain relieved after  324mg PO aspiring administered by EMS.

## 2024-08-15 NOTE — ED PROVIDER NOTE - PHYSICAL EXAMINATION
CONSTITUTIONAL: non-toxic appearing female, nad   SKIN: skin exam is warm and dry  HEAD: Normocephalic; atraumatic    ENT: MMM  CARD: S1, S2 normal, no murmur  RESP: No wheezes, rales or rhonchi. Good air movement bilaterally  ABD: soft; non-distended; non-tender.    EXT: Normal ROM.    NEURO: awake, alert, following commands, oriented, grossly unremarkable. No Focal deficits. GCS 15.   PSYCH: Cooperative, appropriate.

## 2024-08-16 ENCOUNTER — APPOINTMENT (OUTPATIENT)
Dept: CARDIOLOGY | Facility: CLINIC | Age: 59
End: 2024-08-16

## 2024-08-16 VITALS
TEMPERATURE: 98 F | SYSTOLIC BLOOD PRESSURE: 122 MMHG | DIASTOLIC BLOOD PRESSURE: 68 MMHG | OXYGEN SATURATION: 99 % | RESPIRATION RATE: 18 BRPM | HEART RATE: 58 BPM

## 2024-08-16 PROCEDURE — 75574 CT ANGIO HRT W/3D IMAGE: CPT | Mod: 26,MC

## 2024-08-16 PROCEDURE — 99239 HOSP IP/OBS DSCHRG MGMT >30: CPT

## 2024-08-16 RX ORDER — KETOROLAC TROMETHAMINE 10 MG
15 TABLET ORAL ONCE
Refills: 0 | Status: DISCONTINUED | OUTPATIENT
Start: 2024-08-16 | End: 2024-08-16

## 2024-08-16 RX ADMIN — Medication 15 MILLIGRAM(S): at 01:45

## 2024-08-16 NOTE — ED CDU PROVIDER DISPOSITION NOTE - CLINICAL COURSE
68-year-old male history of hypertension, diabetes, hyperlipidemia, CAD status post CABG, CHF, was placed in CDU for evaluation intermittent chest discomfort on exertion over the last week or so.  No shortness of breath, nausea, vomiting.  ED workup unremarkable.  Patient is comfortable on my eval.  Troponins 18 and 18.  EKG no acute changes.  Nuclear stress test suggestive of small area of anterior wall ischemia.  I spoke with patient's cardiologist, Dr. Mims. Knows pt well. He reviewed nuclear images; not certain pt would benefit from cath at this time vs medical management. Rec imdur 30mg daily and close followup in the office on Monday. Results d/w pt, he understands to return to the ED immediately for persistent or worsening sx.

## 2024-08-16 NOTE — ED CDU PROVIDER DISPOSITION NOTE - PATIENT PORTAL LINK FT
You can access the FollowMyHealth Patient Portal offered by [facility] by registering at the following website: [email or web address]. By joining [employer]s FollowMyHealth portal, you will also be able to view your health information using other applications (apps) compatible with our system.

## 2024-08-16 NOTE — ED CDU PROVIDER SUBSEQUENT DAY NOTE - CLINICAL SUMMARY MEDICAL DECISION MAKING FREE TEXT BOX
58yo woman h/o afib on eliquis was placed in CDU for eval of chest pain over the last month. ED workup unremarkable, pt comfortable and chest pain free on my eval. VS, exam as noted. PT was monitored without incident, remained in nSR. CCTA was CAD-RAD 0 for normal coronary arteries. Results d/w pt and she will f/u PMD/cardiology for further management of ?afib.

## 2024-08-16 NOTE — ED CDU PROVIDER SUBSEQUENT DAY NOTE - HISTORY
patient presents with chest pain that began just PTA. patient follows with dr sharma, cardiologist w/ last stress test 4 years ago. non-smoker, no family hx cad. patient placed in obs for CCTA. patient with hx afib, hypothyroidism, who presents with chest pain. patient reports intermittent chest pain described as tightness that began xseveral weeks ago now constant. patient follows with dr de guzman. patient placed in obs for CCTA

## 2024-08-27 ENCOUNTER — APPOINTMENT (OUTPATIENT)
Dept: CARDIOLOGY | Facility: CLINIC | Age: 59
End: 2024-08-27
Payer: COMMERCIAL

## 2024-08-27 VITALS — HEIGHT: 65 IN | BODY MASS INDEX: 28.32 KG/M2 | WEIGHT: 170 LBS

## 2024-08-27 DIAGNOSIS — I20.9 ANGINA PECTORIS, UNSPECIFIED: ICD-10-CM

## 2024-08-27 DIAGNOSIS — E66.9 OBESITY, UNSPECIFIED: ICD-10-CM

## 2024-08-27 DIAGNOSIS — G47.33 OBSTRUCTIVE SLEEP APNEA (ADULT) (PEDIATRIC): ICD-10-CM

## 2024-08-27 DIAGNOSIS — I48.0 PAROXYSMAL ATRIAL FIBRILLATION: ICD-10-CM

## 2024-08-27 PROCEDURE — 99214 OFFICE O/P EST MOD 30 MIN: CPT

## 2024-08-27 PROCEDURE — G2211 COMPLEX E/M VISIT ADD ON: CPT | Mod: NC

## 2024-08-27 RX ORDER — LEVOTHYROXINE SODIUM 25 UG/1
25 TABLET ORAL
Refills: 0 | Status: ACTIVE | COMMUNITY
Start: 2024-08-27

## 2024-08-27 RX ORDER — PANTOPRAZOLE 40 MG/1
40 TABLET, DELAYED RELEASE ORAL DAILY
Qty: 90 | Refills: 3 | Status: ACTIVE | COMMUNITY
Start: 2024-08-27 | End: 1900-01-01

## 2024-08-27 RX ORDER — RANOLAZINE 500 MG/1
500 TABLET, EXTENDED RELEASE ORAL
Qty: 180 | Refills: 3 | Status: ACTIVE | COMMUNITY
Start: 2024-08-27 | End: 1900-01-01

## 2024-08-27 NOTE — PHYSICAL EXAM
[Normal Venous Pressure] : normal venous pressure [Normal S1, S2] : normal S1, S2 [Clear Lung Fields] : clear lung fields [Soft] : abdomen soft [No Edema] : no edema [de-identified] : RRR

## 2024-08-27 NOTE — HISTORY OF PRESENT ILLNESS
[FreeTextEntry1] : Pt with NEW ONSET AFIB PAF IN  was in hospital, Anxiety, ELPIDIO with CPAP in past prior to weight loss, gastric sleeve  (lost 140 lbs),   PT FEELS AFIB..  pt had high hr to 137 and not d/c'ed on meds from hospital as low hr prior to d/c  pt drank vodka and started tirzapetide recently  pt had no cp or sob with afib. pt has h/o anxiety.  potassium 3.9, pt only had limited echo with no mention LAE.   3/8/24:  Echo: lvef 60%, e' sept: 0.12 m/s, normal atrium: LAESVI: 23 ml/m2  GLS: -20.6%  24: T, HDL: 74, LDL: 83, BNP: 141, TSH: 4.77 potassium 4.7  Patient denies cp, sob, dizziness/syncope. Patient has not had anymore palpitations.  pt thyroid elevated a little bit.   24: 3/24: MCOT: avg HR: 65bpm, PVCs: 1% no AFIB, pt has not felt any episodes.  possibly stop eliquis as no events on monitor  pt thyroid nodule f/u with endocrine and started on synthorid.  pt has no episodes of afib.   24: ELIQUIS STOPPED . Pt feels afib  pt has not had any further afib, pt went to hospital on 24 with chest tightness that woke her up at night for the last few weeks.  PT HAD CTA; NORMAL CORONARIES AND cac zero.  reviewed all meds. pt says when don't drink alcohol does not get symptoms, so possibly gerd.  stop alcohol for now.

## 2024-08-27 NOTE — DISCUSSION/SUMMARY
[FreeTextEntry1] : pt was in [facility] with AFIB.  chads vasc 1 given pill in a pocket as patient feels afib (take 3 propafenone pills if senses afib) avoid stress, caffeine, low potassium  get yan w/u d/w patient at length as can potentiate afib  Patient to f/u with PCP for thyroid.  pt mcot 2024 no afib and senses when afib happens  use apple watch. for tachycardia.  STOP ELIQUIS 5/24 if afib recurs to call and restart  start ranolazine 500 mg po q12  bloodwork f/u in November

## 2024-08-27 NOTE — DISCUSSION/SUMMARY
[FreeTextEntry1] : pt was in [facility] with AFIB.  chads vasc 1 given pill in a pocket as patient feels afib (take 3 propafenone pills if senses afib) avoid stress, caffeine, low potassium  get yan w/u d/w patient at length as can potentiate afib  Patient to f/u with PCP for thyroid.  pt mcot 2024 no afib and senses when afib happens  use apple watch. for tachycardia.  STOP ELIQUIS 5/24 if afib recurs to call and restart  start ranolazine 500 mg po q12  bloodwork f/u in November

## 2024-08-27 NOTE — PHYSICAL EXAM
[Normal Venous Pressure] : normal venous pressure [Normal S1, S2] : normal S1, S2 [Clear Lung Fields] : clear lung fields [Soft] : abdomen soft [No Edema] : no edema [de-identified] : RRR

## 2024-11-19 ENCOUNTER — NON-APPOINTMENT (OUTPATIENT)
Age: 59
End: 2024-11-19

## 2024-11-19 ENCOUNTER — APPOINTMENT (OUTPATIENT)
Dept: CARDIOLOGY | Facility: CLINIC | Age: 59
End: 2024-11-19
Payer: COMMERCIAL

## 2024-11-19 VITALS
BODY MASS INDEX: 27.82 KG/M2 | DIASTOLIC BLOOD PRESSURE: 70 MMHG | HEART RATE: 63 BPM | WEIGHT: 167 LBS | HEIGHT: 65 IN | SYSTOLIC BLOOD PRESSURE: 130 MMHG

## 2024-11-19 DIAGNOSIS — I48.0 PAROXYSMAL ATRIAL FIBRILLATION: ICD-10-CM

## 2024-11-19 DIAGNOSIS — G47.33 OBSTRUCTIVE SLEEP APNEA (ADULT) (PEDIATRIC): ICD-10-CM

## 2024-11-19 DIAGNOSIS — E66.9 OBESITY, UNSPECIFIED: ICD-10-CM

## 2024-11-19 DIAGNOSIS — I20.9 ANGINA PECTORIS, UNSPECIFIED: ICD-10-CM

## 2024-11-19 PROCEDURE — G2211 COMPLEX E/M VISIT ADD ON: CPT | Mod: NC

## 2024-11-19 PROCEDURE — 99214 OFFICE O/P EST MOD 30 MIN: CPT

## 2024-12-25 PROBLEM — F10.90 ALCOHOL USE: Status: ACTIVE | Noted: 2017-05-19

## 2025-01-21 ENCOUNTER — APPOINTMENT (OUTPATIENT)
Dept: GYNECOLOGIC ONCOLOGY | Facility: CLINIC | Age: 60
End: 2025-01-21
Payer: COMMERCIAL

## 2025-01-21 VITALS
HEART RATE: 63 BPM | WEIGHT: 167 LBS | HEIGHT: 65 IN | DIASTOLIC BLOOD PRESSURE: 83 MMHG | SYSTOLIC BLOOD PRESSURE: 128 MMHG | BODY MASS INDEX: 27.82 KG/M2

## 2025-01-21 DIAGNOSIS — R19.00 INTRA-ABDOMINAL AND PELVIC SWELLING, MASS AND LUMP, UNSPECIFIED SITE: ICD-10-CM

## 2025-01-21 DIAGNOSIS — E66.01 MORBID (SEVERE) OBESITY DUE TO EXCESS CALORIES: ICD-10-CM

## 2025-01-21 DIAGNOSIS — C53.9 MALIGNANT NEOPLASM OF CERVIX UTERI, UNSPECIFIED: ICD-10-CM

## 2025-01-21 DIAGNOSIS — N83.209 UNSPECIFIED OVARIAN CYST, UNSPECIFIED SIDE: ICD-10-CM

## 2025-01-21 DIAGNOSIS — R10.2 PELVIC AND PERINEAL PAIN: ICD-10-CM

## 2025-01-21 PROCEDURE — 99204 OFFICE O/P NEW MOD 45 MIN: CPT

## 2025-01-21 PROCEDURE — 99459 PELVIC EXAMINATION: CPT

## 2025-01-24 LAB — CANCER AG125 SERPL-ACNC: 18 U/ML

## 2025-01-28 ENCOUNTER — NON-APPOINTMENT (OUTPATIENT)
Age: 60
End: 2025-01-28

## 2025-02-24 ENCOUNTER — OUTPATIENT (OUTPATIENT)
Dept: OUTPATIENT SERVICES | Facility: HOSPITAL | Age: 60
LOS: 1 days | End: 2025-02-24
Payer: COMMERCIAL

## 2025-02-24 VITALS
DIASTOLIC BLOOD PRESSURE: 78 MMHG | HEART RATE: 50 BPM | RESPIRATION RATE: 19 BRPM | SYSTOLIC BLOOD PRESSURE: 122 MMHG | OXYGEN SATURATION: 98 % | HEIGHT: 65 IN | WEIGHT: 162.92 LBS | TEMPERATURE: 98 F

## 2025-02-24 DIAGNOSIS — R19.00 INTRA-ABDOMINAL AND PELVIC SWELLING, MASS AND LUMP, UNSPECIFIED SITE: ICD-10-CM

## 2025-02-24 DIAGNOSIS — Z90.3 ACQUIRED ABSENCE OF STOMACH [PART OF]: Chronic | ICD-10-CM

## 2025-02-24 DIAGNOSIS — Z01.818 ENCOUNTER FOR OTHER PREPROCEDURAL EXAMINATION: ICD-10-CM

## 2025-02-24 DIAGNOSIS — Z98.891 HISTORY OF UTERINE SCAR FROM PREVIOUS SURGERY: Chronic | ICD-10-CM

## 2025-02-24 LAB
ALBUMIN SERPL ELPH-MCNC: 4 G/DL — SIGNIFICANT CHANGE UP (ref 3.5–5.2)
ALP SERPL-CCNC: 50 U/L — SIGNIFICANT CHANGE UP (ref 30–115)
ALT FLD-CCNC: 23 U/L — SIGNIFICANT CHANGE UP (ref 0–41)
ANION GAP SERPL CALC-SCNC: 9 MMOL/L — SIGNIFICANT CHANGE UP (ref 7–14)
AST SERPL-CCNC: 24 U/L — SIGNIFICANT CHANGE UP (ref 0–41)
BASOPHILS # BLD AUTO: 0.04 K/UL — SIGNIFICANT CHANGE UP (ref 0–0.2)
BASOPHILS NFR BLD AUTO: 0.9 % — SIGNIFICANT CHANGE UP (ref 0–1)
BILIRUB SERPL-MCNC: 0.7 MG/DL — SIGNIFICANT CHANGE UP (ref 0.2–1.2)
BLD GP AB SCN SERPL QL: SIGNIFICANT CHANGE UP
BUN SERPL-MCNC: 15 MG/DL — SIGNIFICANT CHANGE UP (ref 10–20)
CALCIUM SERPL-MCNC: 9 MG/DL — SIGNIFICANT CHANGE UP (ref 8.4–10.5)
CHLORIDE SERPL-SCNC: 104 MMOL/L — SIGNIFICANT CHANGE UP (ref 98–110)
CO2 SERPL-SCNC: 29 MMOL/L — SIGNIFICANT CHANGE UP (ref 17–32)
CREAT SERPL-MCNC: 0.8 MG/DL — SIGNIFICANT CHANGE UP (ref 0.7–1.5)
EGFR: 85 ML/MIN/1.73M2 — SIGNIFICANT CHANGE UP
EOSINOPHIL # BLD AUTO: 0.01 K/UL — SIGNIFICANT CHANGE UP (ref 0–0.7)
EOSINOPHIL NFR BLD AUTO: 0.2 % — SIGNIFICANT CHANGE UP (ref 0–8)
GLUCOSE SERPL-MCNC: 92 MG/DL — SIGNIFICANT CHANGE UP (ref 70–99)
HCT VFR BLD CALC: 40.8 % — SIGNIFICANT CHANGE UP (ref 37–47)
HGB BLD-MCNC: 13.1 G/DL — SIGNIFICANT CHANGE UP (ref 12–16)
IMM GRANULOCYTES NFR BLD AUTO: 0.2 % — SIGNIFICANT CHANGE UP (ref 0.1–0.3)
LYMPHOCYTES # BLD AUTO: 1.6 K/UL — SIGNIFICANT CHANGE UP (ref 1.2–3.4)
LYMPHOCYTES # BLD AUTO: 35.3 % — SIGNIFICANT CHANGE UP (ref 20.5–51.1)
MCHC RBC-ENTMCNC: 30.5 PG — SIGNIFICANT CHANGE UP (ref 27–31)
MCHC RBC-ENTMCNC: 32.1 G/DL — SIGNIFICANT CHANGE UP (ref 32–37)
MCV RBC AUTO: 94.9 FL — SIGNIFICANT CHANGE UP (ref 81–99)
MONOCYTES # BLD AUTO: 0.39 K/UL — SIGNIFICANT CHANGE UP (ref 0.1–0.6)
MONOCYTES NFR BLD AUTO: 8.6 % — SIGNIFICANT CHANGE UP (ref 1.7–9.3)
NEUTROPHILS # BLD AUTO: 2.48 K/UL — SIGNIFICANT CHANGE UP (ref 1.4–6.5)
NEUTROPHILS NFR BLD AUTO: 54.8 % — SIGNIFICANT CHANGE UP (ref 42.2–75.2)
NRBC BLD AUTO-RTO: 0 /100 WBCS — SIGNIFICANT CHANGE UP (ref 0–0)
PLATELET # BLD AUTO: 206 K/UL — SIGNIFICANT CHANGE UP (ref 130–400)
PMV BLD: 11.9 FL — HIGH (ref 7.4–10.4)
POTASSIUM SERPL-MCNC: 4.9 MMOL/L — SIGNIFICANT CHANGE UP (ref 3.5–5)
POTASSIUM SERPL-SCNC: 4.9 MMOL/L — SIGNIFICANT CHANGE UP (ref 3.5–5)
PROT SERPL-MCNC: 6.5 G/DL — SIGNIFICANT CHANGE UP (ref 6–8)
RBC # BLD: 4.3 M/UL — SIGNIFICANT CHANGE UP (ref 4.2–5.4)
RBC # FLD: 13.7 % — SIGNIFICANT CHANGE UP (ref 11.5–14.5)
SODIUM SERPL-SCNC: 142 MMOL/L — SIGNIFICANT CHANGE UP (ref 135–146)
WBC # BLD: 4.53 K/UL — LOW (ref 4.8–10.8)
WBC # FLD AUTO: 4.53 K/UL — LOW (ref 4.8–10.8)

## 2025-02-24 PROCEDURE — 86901 BLOOD TYPING SEROLOGIC RH(D): CPT

## 2025-02-24 PROCEDURE — 93010 ELECTROCARDIOGRAM REPORT: CPT

## 2025-02-24 PROCEDURE — 93005 ELECTROCARDIOGRAM TRACING: CPT

## 2025-02-24 PROCEDURE — 86850 RBC ANTIBODY SCREEN: CPT

## 2025-02-24 PROCEDURE — 99214 OFFICE O/P EST MOD 30 MIN: CPT | Mod: 25

## 2025-02-24 PROCEDURE — 36415 COLL VENOUS BLD VENIPUNCTURE: CPT

## 2025-02-24 PROCEDURE — 80053 COMPREHEN METABOLIC PANEL: CPT

## 2025-02-24 PROCEDURE — 85025 COMPLETE CBC W/AUTO DIFF WBC: CPT

## 2025-02-24 PROCEDURE — 86900 BLOOD TYPING SEROLOGIC ABO: CPT

## 2025-02-24 NOTE — H&P PST ADULT - REASON FOR ADMISSION
Patient is a 59  year old  female presenting to PAST in preparation for  EXAM UNDER ANESTHESIA, TOTAL LAPAROSCOPIC HYSTERECTOMY, BILATERAL SALPINGO OOPHORECTOMY, ALL OTHER INDICATED PROCEDURES  on   3/17/25 under general anesthesia by Dr. briseno

## 2025-02-24 NOTE — H&P PST ADULT - HISTORY OF PRESENT ILLNESS
pt with ovarian cysts  now for planned procedure     PATIENT/GUARDIAN CURRENTLY DENIES CHEST PAIN  SHORTNESS OF BREATH  PALPITATIONS,  DYSURIA, OR UPPER RESPIRATORY INFECTION IN PAST 2 WEEKS  denies travel outside the [place] in the past 30 days    Anesthesia Alert  NO--Difficult Airway  NO--History of neck surgery or radiation  NO--Limited ROM of neck  NO--History of Malignant hyperthermia  NO--No personal or family history of Pseudocholinesterase deficiency.  NO--Prior Anesthesia Complication  NO--Latex Allergy  NO--Loose teeth  NO--History of Rheumatoid Arthritis  NO--Bleeding risk  NO--ELPIDIO  NO--Other_____    PT/GUARDIAN DENIES ANY RASHES, ABRASION, OR OPEN WOUNDS OR CUTS    AS PER THE PT/GUARDIAN, THIS IS HIS/HER COMPLETE MEDICAL AND SURGICAL HX, INCLUDING MEDICATIONS PRESCRIBED AND OVER THE COUNTER    Patient/guardian understands the instructions and was given the opportunity to ask questions and have them answered.    pt/guardian denies any suicidal ideation or thoughts, pt states not a threat to self or others      Duke Activity Status Index (DASI)  58.2 points  The higher the score (maximum 58.2), the higher the functional status.  9.89 METs    Revised Cardiac Risk Index for Pre-Operative Risk  0 points  RCRI Score  3.9 %  Risk of major cardiac event

## 2025-02-24 NOTE — H&P PST ADULT - ATTENDING COMMENTS
59 year old, para 2-0-1-1,  x 1,  x 1, LMP Oct 2024, patient of Dr. Wirght, with c/o RLQ pain. Patient was evaluated in [place] for right lower quadrant pain and found to have a pelvic mass on CT abdomen and pelvis. She is requesting definitive surgical management.     Assessment  Pelvic pain in female (625.9) (R10.2)  History of Cervical cancer, FIGO stage I (180.9) (C53.9)  History of Morbid obesity due to excess calories (278.01) (E66.01)  Pelvic mass in female (789.30) (R19.00)      Plan  EUA TLH/BSO and all indicated procedures  Options for surgical management discussed. Procedures offered patient included laparoscopic hysterectomy with bilateral salpingo-oophorectomy, along with possible staging.    The risk benefits and alternative to the recommended treatments were discussed. She was informed about potential complications of surgery including but not limited to bowel, bladder, and ureteral injuries. Infectious morbidity, along with bleeding and thromboembolic events were also discussed.  She showed clear understanding, was given the opportunity to ask questions and is amenable to the above surgical treatment.

## 2025-02-24 NOTE — H&P PST ADULT - ATTENDING PHYSICIAN: I HAVE REVIEWED THE CLINICAL DOCUMENTATION AND AGREE WITH THE ABOVE NOTE
Patient: Jose Masterson Date: 2017   : 1955 Attending: Shawna Poe MD   62 year old male      Subjective:  Afebrile    Ongoing concerns regarding an area of radiation injury - question fistula per history    ENT unable to assess due to frozen jaw    Review of Systems: No witnessed choking here     Reviewed Pertinent: Medications, Surgical History, Radiology and Labs : Merrem    Vital Last Value 24 Hour Range   Temperature 98.3 °F (36.8 °C) (17 1347) Temp  Min: 97.8 °F (36.6 °C)  Max: 98.3 °F (36.8 °C)   Pulse 65 (17 134) Pulse  Min: 65  Max: 77   Respiratory 18 (17 134) Resp  Min: 18  Max: 20   Non-Invasive  Blood Pressure 134/82 (17 134) BP  Min: 134/82  Max: 154/88   Pulse Oximetry 95 % (17 134) SpO2  Min: 95 %  Max: 97 %   Arterial   Blood Pressure   No Data Recorded     Vital Today Admit   Weight 75.2 kg (17 0631) Weight: 71.9 kg (17 1903)   Height N/A Height: 5' 10\" (177.8 cm) (17 2200)   BMI N/A BMI (Calculated): 22.03 (17 0546)     Weight over the past 48 Hours:  Patient Vitals for the past 48 hrs:   Weight   17 0518 75 kg   17 0631 75.2 kg      Intake/Output:  Last Stool Occurrence: 1 (per pt) (17 1351)    No intake/output data recorded.    I/O last 3 completed shifts:  In: 870 [NG/GT:870]  Out: -     Physical Exam:  Neck/R jaw - appear fineLungs: CTA  COR: RRR  Abd: benign    Laboratory Results:    Lab Results   Component Value Date    SODIUM 141 2017    POTASSIUM 4.1 2017    BUN 12 2017    CREATININE 0.68 2017    GLUCOSE 82 2017    HCT 32.5 (L) 2017    HGB 10.6 (L) 2017     2017    INR 1.0 2017    PTT 31 (H) 2017    BILIRUBIN 1.0 2017    AST 30 2017    GPT 14 2017    ALKPT 69 2017    ALBUMIN 3.5 (L) 2017    CRP 5.0 (H) 10/18/2017     Urinalysis:    Lab Results   Component Value Date    USPG 1.008 2017     UWBC NEGATIVE 11/25/2017    URBC NEGATIVE 11/25/2017    UBILI NEGATIVE 11/25/2017    UPH 6.0 11/25/2017    UBACTR NONE SEEN 11/09/2017     Cultures: Reviewed    Imaging: Reviewed    Assessment:   1. Aspiration - resolved  2. Chronic radiation injury - extent unclear to me  3. H/o H&N CA  4. Chronic dysphagia    Plan:  1. Have D/C'd the Meropenem  2. No need for further Abx from my standpoint  3. Continue outpatient f/u with Mich Nunez, and Garry    Discussed with: Family and Patient; Dr. Tonny Licea MD   Statement Selected

## 2025-02-25 DIAGNOSIS — R19.00 INTRA-ABDOMINAL AND PELVIC SWELLING, MASS AND LUMP, UNSPECIFIED SITE: ICD-10-CM

## 2025-02-25 DIAGNOSIS — Z01.818 ENCOUNTER FOR OTHER PREPROCEDURAL EXAMINATION: ICD-10-CM

## 2025-03-15 NOTE — ASU PATIENT PROFILE, ADULT - SITE
Exam under anesthesia, total laproscopic hysterectomy, bilateral salpingo-oophorectomy and all other indicated procedures

## 2025-03-17 ENCOUNTER — OUTPATIENT (OUTPATIENT)
Dept: OUTPATIENT SERVICES | Facility: HOSPITAL | Age: 60
LOS: 1 days | Discharge: HOME | End: 2025-03-17
Payer: COMMERCIAL

## 2025-03-17 ENCOUNTER — TRANSCRIPTION ENCOUNTER (OUTPATIENT)
Age: 60
End: 2025-03-17

## 2025-03-17 ENCOUNTER — RESULT REVIEW (OUTPATIENT)
Age: 60
End: 2025-03-17

## 2025-03-17 ENCOUNTER — APPOINTMENT (OUTPATIENT)
Dept: GYNECOLOGIC ONCOLOGY | Facility: HOSPITAL | Age: 60
End: 2025-03-17
Payer: COMMERCIAL

## 2025-03-17 VITALS
SYSTOLIC BLOOD PRESSURE: 111 MMHG | OXYGEN SATURATION: 99 % | HEART RATE: 55 BPM | HEIGHT: 65 IN | DIASTOLIC BLOOD PRESSURE: 55 MMHG | WEIGHT: 162.92 LBS | RESPIRATION RATE: 18 BRPM | TEMPERATURE: 98 F

## 2025-03-17 VITALS
HEART RATE: 70 BPM | RESPIRATION RATE: 21 BRPM | TEMPERATURE: 98 F | SYSTOLIC BLOOD PRESSURE: 103 MMHG | OXYGEN SATURATION: 99 % | DIASTOLIC BLOOD PRESSURE: 60 MMHG

## 2025-03-17 DIAGNOSIS — N80.03 ADENOMYOSIS OF THE UTERUS: ICD-10-CM

## 2025-03-17 DIAGNOSIS — Z98.84 BARIATRIC SURGERY STATUS: ICD-10-CM

## 2025-03-17 DIAGNOSIS — N83.01 FOLLICULAR CYST OF RIGHT OVARY: ICD-10-CM

## 2025-03-17 DIAGNOSIS — N84.0 POLYP OF CORPUS UTERI: ICD-10-CM

## 2025-03-17 DIAGNOSIS — R19.00 INTRA-ABDOMINAL AND PELVIC SWELLING, MASS AND LUMP, UNSPECIFIED SITE: ICD-10-CM

## 2025-03-17 DIAGNOSIS — N83.02 FOLLICULAR CYST OF LEFT OVARY: ICD-10-CM

## 2025-03-17 DIAGNOSIS — D25.9 LEIOMYOMA OF UTERUS, UNSPECIFIED: ICD-10-CM

## 2025-03-17 DIAGNOSIS — Z90.3 ACQUIRED ABSENCE OF STOMACH [PART OF]: Chronic | ICD-10-CM

## 2025-03-17 DIAGNOSIS — N83.8 OTHER NONINFLAMMATORY DISORDERS OF OVARY, FALLOPIAN TUBE AND BROAD LIGAMENT: ICD-10-CM

## 2025-03-17 DIAGNOSIS — N70.11 CHRONIC SALPINGITIS: ICD-10-CM

## 2025-03-17 DIAGNOSIS — Z79.85 LONG-TERM (CURRENT) USE OF INJECTABLE NON-INSULIN ANTIDIABETIC DRUGS: ICD-10-CM

## 2025-03-17 DIAGNOSIS — Z87.891 PERSONAL HISTORY OF NICOTINE DEPENDENCE: ICD-10-CM

## 2025-03-17 DIAGNOSIS — Z98.891 HISTORY OF UTERINE SCAR FROM PREVIOUS SURGERY: Chronic | ICD-10-CM

## 2025-03-17 DIAGNOSIS — I48.91 UNSPECIFIED ATRIAL FIBRILLATION: ICD-10-CM

## 2025-03-17 DIAGNOSIS — N73.6 FEMALE PELVIC PERITONEAL ADHESIONS (POSTINFECTIVE): ICD-10-CM

## 2025-03-17 DIAGNOSIS — R10.2 PELVIC AND PERINEAL PAIN: ICD-10-CM

## 2025-03-17 DIAGNOSIS — Z85.41 PERSONAL HISTORY OF MALIGNANT NEOPLASM OF CERVIX UTERI: ICD-10-CM

## 2025-03-17 PROCEDURE — 88331 PATH CONSLTJ SURG 1 BLK 1SPC: CPT | Mod: 26

## 2025-03-17 PROCEDURE — C1889: CPT

## 2025-03-17 PROCEDURE — 88331 PATH CONSLTJ SURG 1 BLK 1SPC: CPT

## 2025-03-17 PROCEDURE — C9399: CPT

## 2025-03-17 PROCEDURE — 58572 TLH UTERUS OVER 250 G: CPT

## 2025-03-17 PROCEDURE — 57425 LAPAROSCOPY SURG COLPOPEXY: CPT

## 2025-03-17 PROCEDURE — 88307 TISSUE EXAM BY PATHOLOGIST: CPT

## 2025-03-17 PROCEDURE — 88307 TISSUE EXAM BY PATHOLOGIST: CPT | Mod: 26

## 2025-03-17 RX ORDER — ACETAMINOPHEN 500 MG/5ML
1000 LIQUID (ML) ORAL ONCE
Refills: 0 | Status: COMPLETED | OUTPATIENT
Start: 2025-03-17 | End: 2025-03-17

## 2025-03-17 RX ORDER — IBUPROFEN 200 MG
1 TABLET ORAL
Qty: 28 | Refills: 2
Start: 2025-03-17 | End: 2025-04-06

## 2025-03-17 RX ORDER — SIMETHICONE 80 MG
80 TABLET,CHEWABLE ORAL EVERY 6 HOURS
Refills: 0 | Status: DISCONTINUED | OUTPATIENT
Start: 2025-03-17 | End: 2025-03-17

## 2025-03-17 RX ORDER — OXYCODONE HYDROCHLORIDE 30 MG/1
1 TABLET ORAL
Qty: 8 | Refills: 0
Start: 2025-03-17 | End: 2025-03-18

## 2025-03-17 RX ORDER — DOCUSATE SODIUM 100 MG
1 CAPSULE ORAL
Qty: 60 | Refills: 1
Start: 2025-03-17 | End: 2025-05-15

## 2025-03-17 RX ORDER — SIMETHICONE 80 MG
1 TABLET,CHEWABLE ORAL
Qty: 20 | Refills: 0
Start: 2025-03-17 | End: 2025-03-21

## 2025-03-17 RX ORDER — SODIUM CHLORIDE 9 G/1000ML
1000 INJECTION, SOLUTION INTRAVENOUS
Refills: 0 | Status: DISCONTINUED | OUTPATIENT
Start: 2025-03-17 | End: 2025-03-17

## 2025-03-17 RX ORDER — ACETAMINOPHEN 500 MG/5ML
2 LIQUID (ML) ORAL
Qty: 56 | Refills: 0
Start: 2025-03-17 | End: 2025-03-23

## 2025-03-17 RX ADMIN — Medication 80 MILLIGRAM(S): at 13:38

## 2025-03-17 RX ADMIN — Medication 4 MILLIGRAM(S): at 13:00

## 2025-03-17 RX ADMIN — Medication 4 MILLIGRAM(S): at 12:30

## 2025-03-17 RX ADMIN — SODIUM CHLORIDE 75 MILLILITER(S): 9 INJECTION, SOLUTION INTRAVENOUS at 12:15

## 2025-03-17 RX ADMIN — Medication 1000 MILLIGRAM(S): at 13:35

## 2025-03-17 RX ADMIN — Medication 400 MILLIGRAM(S): at 13:05

## 2025-03-17 NOTE — ASU DISCHARGE PLAN (ADULT/PEDIATRIC) - CARE PROVIDER_API CALL
Cristela Luna  Gynecologic Oncology  [address] [place] [address], [address] [place] [address]  Phone: [phone]  Fax: [phone]  Follow Up Time:

## 2025-03-17 NOTE — ASU DISCHARGE PLAN (ADULT/PEDIATRIC) - NS MD DC FALL RISK RISK
For information on Fall & Injury Prevention, visit: [email or web address] OR  [email or web address] OR  https://www.cdc.gov/steadi/patient.html

## 2025-03-17 NOTE — PRE-ANESTHESIA EVALUATION ADULT - NS MD HP INPLANTS MED DEV
None General: NAD, well-developed, awake and alert, responsive to questions and commands, pleasant  HEENT: NCAT, PERRL, EOMI, no conjunctival injection, no nasal discharge or congestion, MMM, clear non-erythematous OP  Neck: soft and supple, no cervical LAD  Cardiovascular: RRR, normal S1/S2, no murmurs appreciated, cap refill <2s, radial pulses 2+ bilaterally  Respiratory: CTAB, symmetric chest rise, non-labored breathing, no retractions, no wheezes  Abdominal: soft, non-distended, mildly tender to palpation over periumbilical region, no rebound tenderness or guarding, no HSM  MSK: MAEE, no obvious joint deformities or tenderness  Extremities: WWP, non-erythematous, non-edematous  Neuro: awake and alert, interactive  Skin: dry, intact, no obvious rashes or lesions

## 2025-03-17 NOTE — ASU DISCHARGE PLAN (ADULT/PEDIATRIC) - FINANCIAL ASSISTANCE
[facility] provides services at a reduced cost to those who are determined to be eligible through [facility]’s financial assistance program. Information regarding [facility]’s financial assistance program can be found by going to [email or web address] or by calling [phone].

## 2025-03-17 NOTE — BRIEF OPERATIVE NOTE - NSICDXBRIEFPROCEDURE_GEN_ALL_CORE_FT
PROCEDURES:  Laparoscopic hysterectomy, total, uterus 250 g or less 17-Mar-2025 11:32:38  Tarik Calvo  Bilateral salpingoophorectomy 17-Mar-2025 11:32:46  Tarik Calvo  Lysis of pelvic adhesions 17-Mar-2025 11:32:59  Tarik Calvo

## 2025-03-17 NOTE — BRIEF OPERATIVE NOTE - OPERATION/FINDINGS
Dense omental adhesions to anterior abdominal wall and right pelvic side wall. Normal appearing uterus, left fallopian tube and ovary. Right hydrosalpinx and 3cm left ovarian cyst with benign frozen pathology.

## 2025-03-25 ENCOUNTER — APPOINTMENT (OUTPATIENT)
Dept: GYNECOLOGIC ONCOLOGY | Facility: CLINIC | Age: 60
End: 2025-03-25
Payer: COMMERCIAL

## 2025-03-25 DIAGNOSIS — R19.00 INTRA-ABDOMINAL AND PELVIC SWELLING, MASS AND LUMP, UNSPECIFIED SITE: ICD-10-CM

## 2025-03-25 PROBLEM — Z90.710 STATUS POST HYSTERECTOMY WITH OOPHORECTOMY: Status: ACTIVE | Noted: 2025-03-25

## 2025-03-25 PROCEDURE — 99024 POSTOP FOLLOW-UP VISIT: CPT

## 2025-03-25 RX ORDER — ESTRADIOL 0.1 MG/D
0.1 PATCH, EXTENDED RELEASE TRANSDERMAL
Qty: 8 | Refills: 6 | Status: ACTIVE | COMMUNITY
Start: 2025-03-25 | End: 1900-01-01

## 2025-03-26 ENCOUNTER — INPATIENT (INPATIENT)
Facility: HOSPITAL | Age: 60
LOS: 1 days | Discharge: HOME | DRG: 392 | End: 2025-03-28
Attending: OBSTETRICS & GYNECOLOGY | Admitting: OBSTETRICS & GYNECOLOGY
Payer: COMMERCIAL

## 2025-03-26 VITALS
RESPIRATION RATE: 18 BRPM | HEIGHT: 65 IN | TEMPERATURE: 98 F | HEART RATE: 65 BPM | DIASTOLIC BLOOD PRESSURE: 74 MMHG | WEIGHT: 160.06 LBS | OXYGEN SATURATION: 99 % | SYSTOLIC BLOOD PRESSURE: 113 MMHG

## 2025-03-26 DIAGNOSIS — Z98.891 HISTORY OF UTERINE SCAR FROM PREVIOUS SURGERY: Chronic | ICD-10-CM

## 2025-03-26 DIAGNOSIS — Z90.3 ACQUIRED ABSENCE OF STOMACH [PART OF]: Chronic | ICD-10-CM

## 2025-03-26 PROBLEM — I48.91 UNSPECIFIED ATRIAL FIBRILLATION: Chronic | Status: ACTIVE | Noted: 2025-02-24

## 2025-03-26 LAB
ALBUMIN SERPL ELPH-MCNC: 3.6 G/DL — SIGNIFICANT CHANGE UP (ref 3.5–5.2)
ALP SERPL-CCNC: 62 U/L — SIGNIFICANT CHANGE UP (ref 30–115)
ALT FLD-CCNC: 12 U/L — SIGNIFICANT CHANGE UP (ref 0–41)
ANION GAP SERPL CALC-SCNC: 17 MMOL/L — HIGH (ref 7–14)
APPEARANCE UR: CLEAR — SIGNIFICANT CHANGE UP
AST SERPL-CCNC: 16 U/L — SIGNIFICANT CHANGE UP (ref 0–41)
BASOPHILS # BLD AUTO: 0.03 K/UL — SIGNIFICANT CHANGE UP (ref 0–0.2)
BASOPHILS NFR BLD AUTO: 0.3 % — SIGNIFICANT CHANGE UP (ref 0–1)
BILIRUB SERPL-MCNC: 0.5 MG/DL — SIGNIFICANT CHANGE UP (ref 0.2–1.2)
BILIRUB UR-MCNC: NEGATIVE — SIGNIFICANT CHANGE UP
BUN SERPL-MCNC: 19 MG/DL — SIGNIFICANT CHANGE UP (ref 10–20)
CALCIUM SERPL-MCNC: 8.7 MG/DL — SIGNIFICANT CHANGE UP (ref 8.4–10.5)
CHLORIDE SERPL-SCNC: 103 MMOL/L — SIGNIFICANT CHANGE UP (ref 98–110)
CO2 SERPL-SCNC: 19 MMOL/L — SIGNIFICANT CHANGE UP (ref 17–32)
COLOR SPEC: YELLOW — SIGNIFICANT CHANGE UP
CREAT SERPL-MCNC: 1.2 MG/DL — SIGNIFICANT CHANGE UP (ref 0.7–1.5)
DIFF PNL FLD: ABNORMAL
EGFR: 52 ML/MIN/1.73M2 — LOW
EGFR: 52 ML/MIN/1.73M2 — LOW
EOSINOPHIL # BLD AUTO: 0.03 K/UL — SIGNIFICANT CHANGE UP (ref 0–0.7)
EOSINOPHIL NFR BLD AUTO: 0.3 % — SIGNIFICANT CHANGE UP (ref 0–8)
FLUAV AG NPH QL: SIGNIFICANT CHANGE UP
FLUBV AG NPH QL: SIGNIFICANT CHANGE UP
GLUCOSE SERPL-MCNC: 71 MG/DL — SIGNIFICANT CHANGE UP (ref 70–99)
GLUCOSE UR QL: NEGATIVE MG/DL — SIGNIFICANT CHANGE UP
HCG SERPL QL: ABNORMAL
HCG SERPL-ACNC: 6.8 MIU/ML — SIGNIFICANT CHANGE UP
HCT VFR BLD CALC: 39.6 % — SIGNIFICANT CHANGE UP (ref 37–47)
HGB BLD-MCNC: 12.7 G/DL — SIGNIFICANT CHANGE UP (ref 12–16)
IMM GRANULOCYTES NFR BLD AUTO: 0.3 % — SIGNIFICANT CHANGE UP (ref 0.1–0.3)
KETONES UR-MCNC: 15 MG/DL
LACTATE SERPL-SCNC: 2.4 MMOL/L — HIGH (ref 0.7–2)
LEUKOCYTE ESTERASE UR-ACNC: NEGATIVE — SIGNIFICANT CHANGE UP
LIDOCAIN IGE QN: 35 U/L — SIGNIFICANT CHANGE UP (ref 7–60)
LYMPHOCYTES # BLD AUTO: 0.88 K/UL — LOW (ref 1.2–3.4)
LYMPHOCYTES # BLD AUTO: 10.1 % — LOW (ref 20.5–51.1)
MCHC RBC-ENTMCNC: 30.5 PG — SIGNIFICANT CHANGE UP (ref 27–31)
MCHC RBC-ENTMCNC: 32.1 G/DL — SIGNIFICANT CHANGE UP (ref 32–37)
MCV RBC AUTO: 95.2 FL — SIGNIFICANT CHANGE UP (ref 81–99)
MONOCYTES # BLD AUTO: 0.54 K/UL — SIGNIFICANT CHANGE UP (ref 0.1–0.6)
MONOCYTES NFR BLD AUTO: 6.2 % — SIGNIFICANT CHANGE UP (ref 1.7–9.3)
NEUTROPHILS # BLD AUTO: 7.23 K/UL — HIGH (ref 1.4–6.5)
NEUTROPHILS NFR BLD AUTO: 82.8 % — HIGH (ref 42.2–75.2)
NITRITE UR-MCNC: NEGATIVE — SIGNIFICANT CHANGE UP
NRBC BLD AUTO-RTO: 0 /100 WBCS — SIGNIFICANT CHANGE UP (ref 0–0)
PH UR: 5.5 — SIGNIFICANT CHANGE UP (ref 5–8)
PLATELET # BLD AUTO: 163 K/UL — SIGNIFICANT CHANGE UP (ref 130–400)
PMV BLD: 11.9 FL — HIGH (ref 7.4–10.4)
POTASSIUM SERPL-MCNC: 4.9 MMOL/L — SIGNIFICANT CHANGE UP (ref 3.5–5)
POTASSIUM SERPL-SCNC: 4.9 MMOL/L — SIGNIFICANT CHANGE UP (ref 3.5–5)
PROT SERPL-MCNC: 6.5 G/DL — SIGNIFICANT CHANGE UP (ref 6–8)
PROT UR-MCNC: NEGATIVE MG/DL — SIGNIFICANT CHANGE UP
RBC # BLD: 4.16 M/UL — LOW (ref 4.2–5.4)
RBC # FLD: 13 % — SIGNIFICANT CHANGE UP (ref 11.5–14.5)
RSV RNA NPH QL NAA+NON-PROBE: SIGNIFICANT CHANGE UP
SARS-COV-2 RNA SPEC QL NAA+PROBE: SIGNIFICANT CHANGE UP
SODIUM SERPL-SCNC: 139 MMOL/L — SIGNIFICANT CHANGE UP (ref 135–146)
SOURCE RESPIRATORY: SIGNIFICANT CHANGE UP
SP GR SPEC: >1.03 — HIGH (ref 1–1.03)
UROBILINOGEN FLD QL: 0.2 MG/DL — SIGNIFICANT CHANGE UP (ref 0.2–1)
WBC # BLD: 8.74 K/UL — SIGNIFICANT CHANGE UP (ref 4.8–10.8)
WBC # FLD AUTO: 8.74 K/UL — SIGNIFICANT CHANGE UP (ref 4.8–10.8)

## 2025-03-26 PROCEDURE — 71045 X-RAY EXAM CHEST 1 VIEW: CPT | Mod: 26

## 2025-03-26 PROCEDURE — 83605 ASSAY OF LACTIC ACID: CPT

## 2025-03-26 PROCEDURE — 36415 COLL VENOUS BLD VENIPUNCTURE: CPT

## 2025-03-26 PROCEDURE — 74178 CT ABD&PLV WO CNTR FLWD CNTR: CPT | Mod: 26,59

## 2025-03-26 PROCEDURE — 84100 ASSAY OF PHOSPHORUS: CPT

## 2025-03-26 PROCEDURE — 80048 BASIC METABOLIC PNL TOTAL CA: CPT

## 2025-03-26 PROCEDURE — 80053 COMPREHEN METABOLIC PANEL: CPT

## 2025-03-26 PROCEDURE — 99285 EMERGENCY DEPT VISIT HI MDM: CPT

## 2025-03-26 PROCEDURE — 74177 CT ABD & PELVIS W/CONTRAST: CPT | Mod: 26

## 2025-03-26 PROCEDURE — 83735 ASSAY OF MAGNESIUM: CPT

## 2025-03-26 PROCEDURE — 85025 COMPLETE CBC W/AUTO DIFF WBC: CPT

## 2025-03-26 RX ORDER — AMOXICILLIN AND CLAVULANATE POTASSIUM 500; 125 MG/1; MG/1
1 TABLET, FILM COATED ORAL EVERY 12 HOURS
Refills: 0 | Status: DISCONTINUED | OUTPATIENT
Start: 2025-03-27 | End: 2025-03-28

## 2025-03-26 RX ORDER — SODIUM CHLORIDE 9 G/1000ML
1000 INJECTION, SOLUTION INTRAVENOUS
Refills: 0 | Status: DISCONTINUED | OUTPATIENT
Start: 2025-03-26 | End: 2025-03-26

## 2025-03-26 RX ORDER — SODIUM CHLORIDE 9 G/1000ML
1000 INJECTION, SOLUTION INTRAVENOUS
Refills: 0 | Status: DISCONTINUED | OUTPATIENT
Start: 2025-03-26 | End: 2025-03-28

## 2025-03-26 RX ORDER — FUROSEMIDE 10 MG/ML
5 INJECTION INTRAMUSCULAR; INTRAVENOUS ONCE
Refills: 0 | Status: COMPLETED | OUTPATIENT
Start: 2025-03-26 | End: 2025-03-26

## 2025-03-26 RX ORDER — ACETAMINOPHEN 500 MG/5ML
875 LIQUID (ML) ORAL ONCE
Refills: 0 | Status: COMPLETED | OUTPATIENT
Start: 2025-03-26 | End: 2025-03-26

## 2025-03-26 RX ORDER — ONDANSETRON HCL/PF 4 MG/2 ML
4 VIAL (ML) INJECTION ONCE
Refills: 0 | Status: COMPLETED | OUTPATIENT
Start: 2025-03-26 | End: 2025-03-26

## 2025-03-26 RX ORDER — ACETAMINOPHEN 500 MG/5ML
1000 LIQUID (ML) ORAL ONCE
Refills: 0 | Status: COMPLETED | OUTPATIENT
Start: 2025-03-26 | End: 2025-03-27

## 2025-03-26 RX ORDER — AMOXICILLIN AND CLAVULANATE POTASSIUM 500; 125 MG/1; MG/1
1 TABLET, FILM COATED ORAL ONCE
Refills: 0 | Status: COMPLETED | OUTPATIENT
Start: 2025-03-26 | End: 2025-03-26

## 2025-03-26 RX ADMIN — Medication 4 MILLIGRAM(S): at 16:00

## 2025-03-26 RX ADMIN — Medication 2 MILLIGRAM(S): at 17:12

## 2025-03-26 RX ADMIN — Medication 1000 MILLILITER(S): at 21:27

## 2025-03-26 RX ADMIN — Medication 4 MILLIGRAM(S): at 15:27

## 2025-03-26 RX ADMIN — Medication 875 MILLIGRAM(S): at 17:21

## 2025-03-26 RX ADMIN — Medication 2 MILLIGRAM(S): at 16:54

## 2025-03-26 RX ADMIN — AMOXICILLIN AND CLAVULANATE POTASSIUM 1 TABLET(S): 500; 125 TABLET, FILM COATED ORAL at 16:54

## 2025-03-26 RX ADMIN — Medication 100 MILLILITER(S): at 18:39

## 2025-03-26 RX ADMIN — Medication 875 MILLIGRAM(S): at 18:56

## 2025-03-26 RX ADMIN — Medication 1000 MILLILITER(S): at 15:27

## 2025-03-26 NOTE — ED ADULT NURSE NOTE - CAS EDN DISCHARGE INTERVENTIONS
[EENT/Resp Symptoms] : EENT/RESPIRATORY SYMPTOMS [Nasal congestion] : nasal congestion [___ Day(s)] : [unfilled] day(s) [Constant] : constant [Active] : active [Barking cough] : barking cough [In Morning] : in morning [Nasal Congestion] : nasal congestion [Cough] : cough [Change in sleep pattern] : change in sleep pattern [Known Exposure to COVID-19] : no known exposure to COVID-19 [Hx of recent COVID-19 infection] : no history of recent COVID-19 infection [Sick Contacts: ___] : no sick contacts [Wet cough] : wet cough [Nebulizer: ___] : nebulizer: [unfilled] [Fever] : no fever [Rhinorrhea] : no rhinorrhea [Sore Throat] : no sore throat [Shortness of Breath] : no shortness of breath [Tachypnea] : no tachypnea IV intact

## 2025-03-26 NOTE — ED PROVIDER NOTE - PHYSICAL EXAMINATION
CONSTITUTIONAL: Well-appearing; well-nourished; in no apparent distress.   NECK: Supple; non-tender; no cervical lymphadenopathy.    CARDIOVASCULAR: Normal S1, S2; no murmurs, rubs, or gallops.   RESPIRATORY: Normal chest excursion with respiration; breath sounds clear and equal bilaterally; no wheezes, rhonchi, or rales.  GI/: lower bad ttp, L>R; Normal bowel sounds; non-distended  MS: No evidence of trauma or deformity. No CVA tenderness. Normal ROM in all four extremities; non-tender to palpation; distal pulses are normal.   SKIN: healing surgical wound without s/s infection, overall warm; dry; good turgor  NEURO/PSYCH: A & O x 4; grossly unremarkable. mood and manner are appropriate.

## 2025-03-26 NOTE — ED PROVIDER NOTE - ATTENDING APP SHARED VISIT CONTRIBUTION OF CARE
59-year-old female with a past medical history significant for A-fib on Xarelto hypothyroidism who presents abdominal pain.  Patient had a total hysterectomy on March 17.  And states that since last Saturday she has been having fevers and abdominal pain.  Patient denies any chest pain shortness of breath urinary symptoms had 1 episode of nonbloody diarrhea.  Patient denies any other medical complaints.    VITAL SIGNS: I have reviewed nursing notes and confirm.  CONSTITUTIONAL: non-toxic, well appearing  SKIN: no rash, no petechiae.  EYES: EOMI, pink conjunctiva, anicteric  ENT: tongue midline, no exudates, MMM  NECK: Supple; no meningismus, no JVD  CARD: RRR, no murmurs, equal radial pulses bilaterally 2+  RESP: CTAB, no respiratory distress  ABD: Soft, lower abd tenderness, non-distended, no peritoneal signs, no HSM, no CVA tenderness. incisions healing well, no redness, crepitus or discharge       59-year-old female presents with abdominal pain.  Imaging UA pain management reassess dispo pending.

## 2025-03-26 NOTE — PATIENT PROFILE ADULT - LIVING ENVIRONMENT
Misty Velásquez Y  PEDIATRICS  [address] [place] [address]  Phone: [phone]  Fax: [phone]  Follow Up Time: 1-3 days   Misty Velásquez  PEDIATRICS  [address] [place] [address]  Phone: [phone]  Fax: [phone]  Follow Up Time: 1-3 days    Monica Barger)  Pediatric Infectious Disease; Pediatrics  [address] [place] [address]  Phone: [phone]  Fax: [phone]  Follow Up Time:    Misty Velásquez  PEDIATRICS  [address] [place] [address]  Phone: [phone]  Fax: [phone]  Follow Up Time: 1-3 days    Monica Barger)  Pediatric Infectious Disease; Pediatrics  [address] [place] [address]  Phone: [phone]  Fax: [phone]  Follow Up Time: 1 week   no

## 2025-03-26 NOTE — CONSULT NOTE ADULT - SUBJECTIVE AND OBJECTIVE BOX
Chief Complaint: abdominal pain    HPI: 58 yo P2 s/p TLH BSO, lysis of adhesions for hydrosalpinx on 3/17/25 presents complaining of LLQ pain. Patient reports on and off discomfort since the weekend, improved with tylenol. Reports the pain kept her up last night and she could not sleep. Reports hot flashes and occasional fevers at home. Reports she had a temperate of 100 this morning. Report 7/10 LLQ pain that radiates to her back. She took tylenol this morning. Reports an episode of vomiting last night but has been able to tolerate PO since. Denies urinary symptoms. Denies vaginal bleeding or discharge. Patient is passing gas and having bowel movements. Last bowel movement was this morning. Patient had a postop visit with Dr Luna yesterday.    Denies the following: constitutional symptoms, visual symptoms, cardiovascular symptoms, respiratory symptoms, GI symptoms, musculoskeletal symptoms, skin symptoms, neurologic symptoms, hematologic symptoms, allergic symptoms, psychiatric symptoms  Except any pertinent positives listed.     Home Medications:  tirzepatide 5 mg/0.5 mL subcutaneous solution: 5 milligram(s) subcutaneously once a week (17 Mar 2025 07:53)  Allergies  No Known Allergies  Intolerances    PAST MEDICAL & SURGICAL HISTORY:  Cervical cancer  30 years ago  Afib  H/O gastric sleeve  H/O:     FAMILY HISTORY:  Family history of myocardial infarction (Mother)    SOCIAL HISTORY: Denies cigarette use, alcohol use, or illicit drug use    Vital Signs Last 24 Hrs  T(F): 98.1 (26 Mar 2025 14:03), Max: 98.1 (26 Mar 2025 14:03)  HR: 65 (26 Mar 2025 14:03) (65 - 65)  BP: 113/74 (26 Mar 2025 14:03) (113/74 - 113/74)  RR: 18 (26 Mar 2025 14:03) (18 - 18)    General Appearance - AAOx3, NAD  Abdomen - Soft, mild LLQ tenderness, nondistended, no rebound, no rigidity, no guarding. Distractible    GYN/Pelvis: deferred due to exam yesterday    LABS:                        12.7   8.74  )-----------( 163      ( 26 Mar 2025 15:53 )             39.6             139  |  103  |  19  ----------------------------<  71  4.9   |  19  |  1.2    Ca    8.7      26 Mar 2025 15:53    TPro  6.5  /  Alb  3.6  /  TBili  0.5  /  DBili  x   /  AST  16  /  ALT  12  /  AlkPhos  62        Urinalysis Basic - ( 26 Mar 2025 15:53 )    Color: x / Appearance: x / SG: x / pH: x  Gluc: 71 mg/dL / Ketone: x  / Bili: x / Urobili: x   Blood: x / Protein: x / Nitrite: x   Leuk Esterase: x / RBC: x / WBC x   Sq Epi: x / Non Sq Epi: x / Bacteria: x          RADIOLOGY & ADDITIONAL STUDIES:

## 2025-03-26 NOTE — ED ADULT NURSE NOTE - NS ED NURSE RECORD ANOTHER HT AND WT
Tetracycline Pregnancy And Lactation Text: This medication is Pregnancy Category D and not consider safe during pregnancy. It is also excreted in breast milk. Tazorac Counseling:  Patient advised that medication is irritating and drying.  Patient may need to apply sparingly and wash off after an hour before eventually leaving it on overnight.  The patient verbalized understanding of the proper use and possible adverse effects of tazorac.  All of the patient's questions and concerns were addressed. Topical Sulfur Applications Pregnancy And Lactation Text: This medication is Pregnancy Category C and has an unknown safety profile during pregnancy. It is unknown if this topical medication is excreted in breast milk. Birth Control Pills Counseling: Birth Control Pill Counseling: I discussed with the patient the potential side effects of OCPs including but not limited to increased risk of stroke, heart attack, thrombophlebitis, deep venous thrombosis, hepatic adenomas, breast changes, GI upset, headaches, and depression.  The patient verbalized understanding of the proper use and possible adverse effects of OCPs. All of the patient's questions and concerns were addressed. Topical Clindamycin Counseling: Patient counseled that this medication may cause skin irritation or allergic reactions.  In the event of skin irritation, the patient was advised to reduce the amount of the drug applied or use it less frequently.   The patient verbalized understanding of the proper use and possible adverse effects of clindamycin.  All of the patient's questions and concerns were addressed. Topical Clindamycin Pregnancy And Lactation Text: This medication is Pregnancy Category B and is considered safe during pregnancy. It is unknown if it is excreted in breast milk. Benzoyl Peroxide Pregnancy And Lactation Text: This medication is Pregnancy Category C. It is unknown if benzoyl peroxide is excreted in breast milk. Dapsone Pregnancy And Lactation Text: This medication is Pregnancy Category C and is not considered safe during pregnancy or breast feeding. Benzoyl Peroxide Counseling: Patient counseled that medicine may cause skin irritation and bleach clothing.  In the event of skin irritation, the patient was advised to reduce the amount of the drug applied or use it less frequently.   The patient verbalized understanding of the proper use and possible adverse effects of benzoyl peroxide.  All of the patient's questions and concerns were addressed. Yes Tazorac Pregnancy And Lactation Text: This medication is not safe during pregnancy. It is unknown if this medication is excreted in breast milk. Dapsone Counseling: I discussed with the patient the risks of dapsone including but not limited to hemolytic anemia, agranulocytosis, rashes, methemoglobinemia, kidney failure, peripheral neuropathy, headaches, GI upset, and liver toxicity.  Patients who start dapsone require monitoring including baseline LFTs and weekly CBCs for the first month, then every month thereafter.  The patient verbalized understanding of the proper use and possible adverse effects of dapsone.  All of the patient's questions and concerns were addressed. Include Pregnancy/Lactation Warning?: No Erythromycin Pregnancy And Lactation Text: This medication is Pregnancy Category B and is considered safe during pregnancy. It is also excreted in breast milk. Topical Sulfur Applications Counseling: Topical Sulfur Counseling: Patient counseled that this medication may cause skin irritation or allergic reactions.  In the event of skin irritation, the patient was advised to reduce the amount of the drug applied or use it less frequently.   The patient verbalized understanding of the proper use and possible adverse effects of topical sulfur application.  All of the patient's questions and concerns were addressed. Bactrim Counseling:  I discussed with the patient the risks of sulfa antibiotics including but not limited to GI upset, allergic reaction, drug rash, diarrhea, dizziness, photosensitivity, and yeast infections.  Rarely, more serious reactions can occur including but not limited to aplastic anemia, agranulocytosis, methemoglobinemia, blood dyscrasias, liver or kidney failure, lung infiltrates or desquamative/blistering drug rashes. Bactrim Pregnancy And Lactation Text: This medication is Pregnancy Category D and is known to cause fetal risk.  It is also excreted in breast milk. Spironolactone Counseling: Patient advised regarding risks of diarrhea, abdominal pain, hyperkalemia, birth defects (for female patients), liver toxicity and renal toxicity. The patient may need blood work to monitor liver and kidney function and potassium levels while on therapy. The patient verbalized understanding of the proper use and possible adverse effects of spironolactone.  All of the patient's questions and concerns were addressed. Spironolactone Pregnancy And Lactation Text: This medication can cause feminization of the male fetus and should be avoided during pregnancy. The active metabolite is also found in breast milk. Isotretinoin Pregnancy And Lactation Text: This medication is Pregnancy Category X and is considered extremely dangerous during pregnancy. It is unknown if it is excreted in breast milk. Detail Level: Zone Azithromycin Counseling:  I discussed with the patient the risks of azithromycin including but not limited to GI upset, allergic reaction, drug rash, diarrhea, and yeast infections. Topical Retinoid counseling:  Patient advised to apply a pea-sized amount only at bedtime and wait 30 minutes after washing their face before applying.  If too drying, patient may add a non-comedogenic moisturizer. The patient verbalized understanding of the proper use and possible adverse effects of retinoids.  All of the patient's questions and concerns were addressed. Doxycycline Counseling:  Patient counseled regarding possible photosensitivity and increased risk for sunburn.  Patient instructed to avoid sunlight, if possible.  When exposed to sunlight, patients should wear protective clothing, sunglasses, and sunscreen.  The patient was instructed to call the office immediately if the following severe adverse effects occur:  hearing changes, easy bruising/bleeding, severe headache, or vision changes.  The patient verbalized understanding of the proper use and possible adverse effects of doxycycline.  All of the patient's questions and concerns were addressed. Topical Retinoid Pregnancy And Lactation Text: This medication is Pregnancy Category C. It is unknown if this medication is excreted in breast milk. Doxycycline Pregnancy And Lactation Text: This medication is Pregnancy Category D and not consider safe during pregnancy. It is also excreted in breast milk but is considered safe for shorter treatment courses. Azithromycin Pregnancy And Lactation Text: This medication is considered safe during pregnancy and is also secreted in breast milk. Minocycline Counseling: Patient advised regarding possible photosensitivity and discoloration of the teeth, skin, lips, tongue and gums.  Patient instructed to avoid sunlight, if possible.  When exposed to sunlight, patients should wear protective clothing, sunglasses, and sunscreen.  The patient was instructed to call the office immediately if the following severe adverse effects occur:  hearing changes, easy bruising/bleeding, severe headache, or vision changes.  The patient verbalized understanding of the proper use and possible adverse effects of minocycline.  All of the patient's questions and concerns were addressed. Birth Control Pills Pregnancy And Lactation Text: This medication should be avoided if pregnant and for the first 30 days post-partum. Erythromycin Counseling:  I discussed with the patient the risks of erythromycin including but not limited to GI upset, allergic reaction, drug rash, diarrhea, increase in liver enzymes, and yeast infections. Tetracycline Counseling: Patient counseled regarding possible photosensitivity and increased risk for sunburn.  Patient instructed to avoid sunlight, if possible.  When exposed to sunlight, patients should wear protective clothing, sunglasses, and sunscreen.  The patient was instructed to call the office immediately if the following severe adverse effects occur:  hearing changes, easy bruising/bleeding, severe headache, or vision changes.  The patient verbalized understanding of the proper use and possible adverse effects of tetracycline.  All of the patient's questions and concerns were addressed. Patient understands to avoid pregnancy while on therapy due to potential birth defects. High Dose Vitamin A Pregnancy And Lactation Text: High dose vitamin A therapy is contraindicated during pregnancy and breast feeding. High Dose Vitamin A Counseling: Side effects reviewed, pt to contact office should one occur. Isotretinoin Counseling: Patient should get monthly blood tests, not donate blood, not drive at night if vision affected, not share medication, and not undergo elective surgery for 6 months after tx completed. Side effects reviewed, pt to contact office should one occur.

## 2025-03-26 NOTE — ED ADULT TRIAGE NOTE - BSA (M2)
1.8
You can access the FollowMyHealth Patient Portal offered by [facility] by registering at the following website: [email or web address]. By joining [facility]’s FollowMyHealth portal, you will also be able to view your health information using other applications (apps) compatible with our system.

## 2025-03-26 NOTE — ED PROVIDER NOTE - NSICDXPASTSURGICALHX_GEN_ALL_CORE_FT
PAST SURGICAL HISTORY:  H/O gastric sleeve     H/O:      S/P total hysterectomy and bilateral salpingo-oophorectomy

## 2025-03-26 NOTE — ED PROVIDER NOTE - OBJECTIVE STATEMENT
pt with pmhx A-fib on Xarelto, hypothyroidism, presents to ED c/o fever and abdominal pain, of note s/p total hysterectomy 3/17. pain is sharp, nonradiating, moderate. denies exacerbating or relieving factors. Denies chill/HA/dizziness/chest pain/palpitation/sob/n/v/d/ black stool/bloody stool/urinary sxs

## 2025-03-26 NOTE — ED PROVIDER NOTE - CLINICAL SUMMARY MEDICAL DECISION MAKING FREE TEXT BOX
59-year-old female presents with abdominal pain.  Imaging UA pain management. concern for infection, iv antibiotics, iv fluids given. ob/gyn aware. pt to be admitted to ob/gyn. Labs were ordered and reviewed.  Imaging was ordered and reviewed by me.  Appropriate medications for patient's presenting complaints were ordered and effects were reassessed.  Patient's records (prior hospital, ED visit, and/or nursing home notes if available) were reviewed.  Additional history was obtained from EMS, family, and/or PCP (where available).  Escalation to admission/observation was considered. Patient requires inpatient hospitalization - ob/gyn

## 2025-03-26 NOTE — CONSULT NOTE ADULT - ASSESSMENT
60 yo P2 s/p TLH BSO, lysis of adhesions for hydrosalpinx on 3/17/25 with LLQ and postoperative changes on imaging, r/o ureteral injury  -Recommend CT Urogram (discussed with radiologist)  -Recommend starting PO augmentin x 14 days  -NPO/IVF  -Monitor vitals  -Addendum to come after imaging     Dr Cheryl lee

## 2025-03-26 NOTE — PATIENT PROFILE ADULT - FALL HARM RISK - HARM RISK INTERVENTIONS

## 2025-03-26 NOTE — ED ADULT NURSE NOTE - NSFALLHARMRISKINTERV_ED_ALL_ED

## 2025-03-27 DIAGNOSIS — Z90.710 ACQUIRED ABSENCE OF BOTH CERVIX AND UTERUS: Chronic | ICD-10-CM

## 2025-03-27 LAB
ALBUMIN SERPL ELPH-MCNC: 3.3 G/DL — LOW (ref 3.5–5.2)
ALP SERPL-CCNC: 130 U/L — HIGH (ref 30–115)
ALT FLD-CCNC: 43 U/L — HIGH (ref 0–41)
ANION GAP SERPL CALC-SCNC: 10 MMOL/L — SIGNIFICANT CHANGE UP (ref 7–14)
AST SERPL-CCNC: 41 U/L — SIGNIFICANT CHANGE UP (ref 0–41)
BASOPHILS # BLD AUTO: 0.04 K/UL — SIGNIFICANT CHANGE UP (ref 0–0.2)
BASOPHILS NFR BLD AUTO: 0.5 % — SIGNIFICANT CHANGE UP (ref 0–1)
BILIRUB SERPL-MCNC: 0.5 MG/DL — SIGNIFICANT CHANGE UP (ref 0.2–1.2)
BUN SERPL-MCNC: 13 MG/DL — SIGNIFICANT CHANGE UP (ref 10–20)
CALCIUM SERPL-MCNC: 8.1 MG/DL — LOW (ref 8.4–10.5)
CHLORIDE SERPL-SCNC: 103 MMOL/L — SIGNIFICANT CHANGE UP (ref 98–110)
CO2 SERPL-SCNC: 25 MMOL/L — SIGNIFICANT CHANGE UP (ref 17–32)
CREAT SERPL-MCNC: 1.2 MG/DL — SIGNIFICANT CHANGE UP (ref 0.7–1.5)
EGFR: 52 ML/MIN/1.73M2 — LOW
EGFR: 52 ML/MIN/1.73M2 — LOW
EOSINOPHIL # BLD AUTO: 0.04 K/UL — SIGNIFICANT CHANGE UP (ref 0–0.7)
EOSINOPHIL NFR BLD AUTO: 0.5 % — SIGNIFICANT CHANGE UP (ref 0–8)
GLUCOSE SERPL-MCNC: 82 MG/DL — SIGNIFICANT CHANGE UP (ref 70–99)
HCT VFR BLD CALC: 33.8 % — LOW (ref 37–47)
HGB BLD-MCNC: 10.8 G/DL — LOW (ref 12–16)
IMM GRANULOCYTES NFR BLD AUTO: 0.3 % — SIGNIFICANT CHANGE UP (ref 0.1–0.3)
LACTATE SERPL-SCNC: 1.7 MMOL/L — SIGNIFICANT CHANGE UP (ref 0.7–2)
LYMPHOCYTES # BLD AUTO: 1.31 K/UL — SIGNIFICANT CHANGE UP (ref 1.2–3.4)
LYMPHOCYTES # BLD AUTO: 16.9 % — LOW (ref 20.5–51.1)
MCHC RBC-ENTMCNC: 30.9 PG — SIGNIFICANT CHANGE UP (ref 27–31)
MCHC RBC-ENTMCNC: 32 G/DL — SIGNIFICANT CHANGE UP (ref 32–37)
MCV RBC AUTO: 96.6 FL — SIGNIFICANT CHANGE UP (ref 81–99)
MONOCYTES # BLD AUTO: 0.63 K/UL — HIGH (ref 0.1–0.6)
MONOCYTES NFR BLD AUTO: 8.1 % — SIGNIFICANT CHANGE UP (ref 1.7–9.3)
NEUTROPHILS # BLD AUTO: 5.72 K/UL — SIGNIFICANT CHANGE UP (ref 1.4–6.5)
NEUTROPHILS NFR BLD AUTO: 73.7 % — SIGNIFICANT CHANGE UP (ref 42.2–75.2)
NRBC BLD AUTO-RTO: 0 /100 WBCS — SIGNIFICANT CHANGE UP (ref 0–0)
PLATELET # BLD AUTO: 181 K/UL — SIGNIFICANT CHANGE UP (ref 130–400)
PMV BLD: 10.9 FL — HIGH (ref 7.4–10.4)
POTASSIUM SERPL-MCNC: 4.5 MMOL/L — SIGNIFICANT CHANGE UP (ref 3.5–5)
POTASSIUM SERPL-SCNC: 4.5 MMOL/L — SIGNIFICANT CHANGE UP (ref 3.5–5)
PROT SERPL-MCNC: 5.3 G/DL — LOW (ref 6–8)
RBC # BLD: 3.5 M/UL — LOW (ref 4.2–5.4)
RBC # FLD: 13.1 % — SIGNIFICANT CHANGE UP (ref 11.5–14.5)
SODIUM SERPL-SCNC: 138 MMOL/L — SIGNIFICANT CHANGE UP (ref 135–146)
WBC # BLD: 7.76 K/UL — SIGNIFICANT CHANGE UP (ref 4.8–10.8)
WBC # FLD AUTO: 7.76 K/UL — SIGNIFICANT CHANGE UP (ref 4.8–10.8)

## 2025-03-27 RX ORDER — ACETAMINOPHEN 500 MG/5ML
650 LIQUID (ML) ORAL EVERY 6 HOURS
Refills: 0 | Status: DISCONTINUED | OUTPATIENT
Start: 2025-03-27 | End: 2025-03-28

## 2025-03-27 RX ORDER — ACETAMINOPHEN 500 MG/5ML
650 LIQUID (ML) ORAL EVERY 6 HOURS
Refills: 0 | Status: DISCONTINUED | OUTPATIENT
Start: 2025-03-27 | End: 2025-03-27

## 2025-03-27 RX ADMIN — Medication 400 MILLIGRAM(S): at 02:54

## 2025-03-27 RX ADMIN — Medication 2 MILLIGRAM(S): at 02:33

## 2025-03-27 RX ADMIN — Medication 650 MILLIGRAM(S): at 12:12

## 2025-03-27 RX ADMIN — FUROSEMIDE 5 MILLIGRAM(S): 10 INJECTION INTRAMUSCULAR; INTRAVENOUS at 02:25

## 2025-03-27 RX ADMIN — Medication 650 MILLIGRAM(S): at 17:56

## 2025-03-27 RX ADMIN — Medication 2 MILLIGRAM(S): at 10:10

## 2025-03-27 RX ADMIN — AMOXICILLIN AND CLAVULANATE POTASSIUM 1 TABLET(S): 500; 125 TABLET, FILM COATED ORAL at 02:26

## 2025-03-27 RX ADMIN — AMOXICILLIN AND CLAVULANATE POTASSIUM 1 TABLET(S): 500; 125 TABLET, FILM COATED ORAL at 13:14

## 2025-03-27 RX ADMIN — Medication 650 MILLIGRAM(S): at 17:33

## 2025-03-27 RX ADMIN — SODIUM CHLORIDE 125 MILLILITER(S): 9 INJECTION, SOLUTION INTRAVENOUS at 12:41

## 2025-03-27 RX ADMIN — Medication 2 MILLIGRAM(S): at 09:06

## 2025-03-27 RX ADMIN — SODIUM CHLORIDE 125 MILLILITER(S): 9 INJECTION, SOLUTION INTRAVENOUS at 21:20

## 2025-03-27 RX ADMIN — SODIUM CHLORIDE 125 MILLILITER(S): 9 INJECTION, SOLUTION INTRAVENOUS at 02:36

## 2025-03-27 RX ADMIN — Medication 2 MILLIGRAM(S): at 04:28

## 2025-03-27 RX ADMIN — Medication 1000 MILLIGRAM(S): at 04:28

## 2025-03-27 RX ADMIN — Medication 650 MILLIGRAM(S): at 12:22

## 2025-03-27 NOTE — PROGRESS NOTE ADULT - ASSESSMENT
60 yo P2 s/p TLH BSO, lysis of adhesions for hydrosalpinx on 3/17/25, POD 10, with LLQ pain and postoperative changes on imaging, r/o ureteral injury  -diet: NPO/IVF since midnight  -activity: ambulate as tolerated  -DVT ppx: SCDs  -monitor vitals routine  -pain management: tylenol + morphine PRN  -continue IVF hydration   -continue home medications  -CT urogram: possible obstruction of left ureter   -PO augmentin q12h  -f/u labs   60 yo P2 s/p TLH BSO, lysis of adhesions for hydrosalpinx on 3/17/25, POD 10, with LLQ pain and postoperative changes on imaging, r/o ureteral injury  -diet: NPO/IVF since midnight  -activity: ambulate as tolerated  -DVT ppx: SCDs  -monitor vitals routine  -pain management: tylenol + morphine PRN  -continue IVF hydration   -continue home medications  -CT urogram: possible obstruction of left ureter   -PO augmentin q12h  -s/p lasix x1 with good UO  -f/u labs

## 2025-03-27 NOTE — H&P ADULT - NSHPPHYSICALEXAM_GEN_ALL_CORE
Vital Signs Last 24 Hrs  T(F): 98.1 (26 Mar 2025 14:03), Max: 98.1 (26 Mar 2025 14:03)  HR: 65 (26 Mar 2025 14:03) (65 - 65)  BP: 113/74 (26 Mar 2025 14:03) (113/74 - 113/74)  RR: 18 (26 Mar 2025 14:03) (18 - 18)    General Appearance - AAOx3, NAD  Abdomen - Soft, mild LLQ tenderness, nondistended, no rebound, no rigidity, no guarding. Distractible    GYN/Pelvis: deferred due to exam yesterday

## 2025-03-27 NOTE — H&P ADULT - NSHPLABSRESULTS_GEN_ALL_CORE
< from: CT Abdomen and Pelvis Urogram w/wo IV Cont (03.26.25 @ 20:51) >      ACC: [id number] EXAM:  CT ABD PELV UROGRAM WAW IC   ORDERED BY: ALEXA MORALES     PROCEDURE DATE:  03/26/2025          INTERPRETATION:  CLINICAL INFORMATION: Mild hydronephrosis. Evaluate for   ureteral injury . WBC 8.74    PMHx of TLH BSOon 3/26 with LLQ pain    COMPARISON: CT scan of the abdomen and pelvis dated 3/26/2025 at 3:35 PM    CONTRAST/COMPLICATIONS:  IV Contrast:   The patient received 100 ml of Omnipaque 350 with 0 ml   discarded.  Oral Contrast:  None  Complications:  None reported at time of study completion    PROCEDURE:  CT of the Abdomen and Pelvis was performed.  Sagittal and coronal reformats were performed.    FINDINGS:    TUBES AND LINES: None.  LOWER CHEST: There are mild dependent atelectatic changes at thelung   bases. No pleural or pericardial effusion.    LIVER: The liver is normal in size with no evidence of solid mass. The   portal vein is patent. The hepatic veins are opacified.  BILE DUCTS: Normal caliber.  GALLBLADDER: Post cholecystectomy  SPLEEN: Within normal limits.  PANCREAS: The pancreas is normal in size and configuration. No evidence   of mass or pancreatitis.  ADRENALS: Within normal limits.    KIDNEYS/URETERS: Mild delayed left renal enhancement. Mild left   perinephric stranding.Mild left hydronephrosis and hydroureter is noted   down to the level of the previously described 4.6 x 4.1 cm collection in   the left side of the pelvis. Findings are consistent with obstruction of   the ureter at this level.    No evidence of right hydronephrosis, calcified stones, or solid mass.    BLADDER: Contrast material is present within the bladder.  REPRODUCTIVE ORGANS: Post hysterectomy and bilateral   salpingo-oophorectomy.    BOWEL: Post sleeve gastrectomy. Mild fat stranding in themid pelvis   compatible with post surgical change. No evidence of bowel obstruction,   colitis, or inflammatory process. Normal caliber appendix.  PERITONEUM/RETROPERITONEUM: No ascites. No free intraperitoneal air.  VESSELS: Normal caliber aorta.  LYMPH NODES: No abdominal or pelvic adenopathy.  BONES: Degenerative changes of the spine.  ABDOMINAL WALL: Unremarkable    IMPRESSION:    1. Post hysterectomy and bilateral salpingo-oophorectomy.    2. Mild left hydronephrosis and hydroureter is noteddown to the level of   the previously described 4.6 x 4.1 cm collection in the left side of the   pelvis. Findings are consistent with obstruction of the ureter at this   level.    3. No evidence of right hydronephrosis, calcified stones, or solid mass.      --- End of Report ---            KAROL FLOYD MD; Attending Interventional Radiologist  This document has been electronically signed. Mar 26 2025 10:08PM    < end of copied text >

## 2025-03-27 NOTE — H&P ADULT - HISTORY OF PRESENT ILLNESS
Chief Complaint: abdominal pain    HPI: 60 yo P2 s/p TLH BSO, lysis of adhesions for hydrosalpinx on 3/17/25 presents complaining of LLQ pain. Patient reports on and off discomfort since the weekend, improved with tylenol. Reports the pain kept her up last night and she could not sleep. Reports hot flashes and occasional fevers at home. Reports she had a temperate of 100 this morning. Report 7/10 LLQ pain that radiates to her back. She took tylenol this morning. Reports an episode of vomiting last night but has been able to tolerate PO since. Denies urinary symptoms. Denies vaginal bleeding or discharge. Patient is passing gas and having bowel movements. Last bowel movement was this morning. Patient had a postop visit with Dr Luna yesterday.

## 2025-03-27 NOTE — H&P ADULT - NSICDXPASTSURGICALHX_GEN_ALL_CORE_FT
PAST SURGICAL HISTORY:  H/O gastric sleeve     H/O:      S/P total hysterectomy and bilateral salpingo-oophorectomy

## 2025-03-27 NOTE — PROGRESS NOTE ADULT - ATTENDING COMMENTS
pt seen and examined at bedside, still c/o pain however better than on admission, Cr still 1.2, most liekly as a results of dehydrations(lactate trending down after hydration) and recent CT scan, concern for ureteral damage is less liekly. will cont to monitor with repeat Cr and discontinue pain med and schedule as PRN

## 2025-03-27 NOTE — PROGRESS NOTE ADULT - SUBJECTIVE AND OBJECTIVE BOX
PGY4 Note    POD #: 10  HD #: 2    Chief Complaint: LLQ pain    HPI: Pt seen and examined at bedside. She reports the LLQ is better controlled with IV tylenol/IVP morphine. She is ambulating, voiding spontaneously without pain or hematuria and with adequate volume per patient. Was tolerating PO last night, is now NPO after midnight. Denies HA, CP, SOB, nausea, vomiting, fevers, chills, changes in bowel habits.  Denies vaginal bleeding, vaginal discharge, or leakage of watery fluid from the vagina.     PAST MEDICAL & SURGICAL HISTORY:  Cervical cancer 30 years ago  Afib  H/O gastric sleeve  H/O:   S/P total hysterectomy and bilateral salpingo-oophorectomy    Physical Exam  Vital Signs Last 24 Hrs  T(F): 98.2 (27 Mar 2025 05:11), Max: 98.5 (26 Mar 2025 22:42)  HR: 74 (27 Mar 2025 05:11) (55 - 74)  BP: 100/64 (27 Mar 2025 05:11) (100/60 - 113/74)  RR: 18 (27 Mar 2025 05:11) (18 - 18)    Physical exam:  General - AAOx3, NAD  Abdomen: - Soft, nontender, nondistended  Extremities - No calf tenderness, no swelling    Labs:                        12.7   8.74  )-----------( 163      ( 26 Mar 2025 15:53 )             39.6     139  |  103  |  19  ----------------------------<71  4.9  |  19  |  1.2      < from: CT Abdomen and Pelvis Urogram w/wo IV Cont (25 @ 20:51) >    ACC: [id number] EXAM:  CT ABD PELV UROGRAM WAW IC   ORDERED BY: ALEXA MORALES     PROCEDURE DATE:  2025          INTERPRETATION:  CLINICAL INFORMATION: Mild hydronephrosis. Evaluate for   ureteral injury . WBC 8.74    PMHx of [facility] BSOon 3/26 with LLQ pain    COMPARISON: CT scan of the abdomen and pelvis dated 3/26/2025 at 3:35 PM    CONTRAST/COMPLICATIONS:  IV Contrast:   The patient received 100 ml of Omnipaque 350 with 0 ml   discarded.  Oral Contrast:  None  Complications:  None reported at time of study completion    PROCEDURE:  CT of the Abdomen and Pelvis was performed.  Sagittal and coronal reformats were performed.    FINDINGS:    TUBES AND LINES: None.  LOWER CHEST: There are mild dependent atelectatic changes at thelung   bases. No pleural or pericardial effusion.    LIVER: The liver is normal in size with no evidence of solid mass. The   portal vein is patent. The hepatic veins are opacified.  BILE DUCTS: Normal caliber.  GALLBLADDER: Post cholecystectomy  SPLEEN: Within normal limits.  PANCREAS: The pancreas is normal in size and configuration. No evidence   of mass or pancreatitis.  ADRENALS: Within normal limits.    KIDNEYS/URETERS: Mild delayed left renal enhancement. Mild left   perinephric stranding.Mild left hydronephrosis and hydroureter is noted   down to the level of the previously described 4.6 x 4.1 cm collection in   the left side of the pelvis. Findings are consistent with obstruction of   the ureter at this level.    No evidence of right hydronephrosis, calcified stones, or solid mass.    BLADDER: Contrast material is present within the bladder.  REPRODUCTIVE ORGANS: Post hysterectomy and bilateral   salpingo-oophorectomy.    BOWEL: Post sleeve gastrectomy. Mild fat stranding in themid pelvis   compatible with post surgical change. No evidence of bowel obstruction,   colitis, or inflammatory process. Normal caliber appendix.  PERITONEUM/RETROPERITONEUM: No ascites. No free intraperitoneal air.  VESSELS: Normal caliber aorta.  LYMPH NODES: No abdominal or pelvic adenopathy.  BONES: Degenerative changes of the spine.  ABDOMINAL WALL: Unremarkable    IMPRESSION:    1. Post hysterectomy and bilateral salpingo-oophorectomy.    2. Mild left hydronephrosis and hydroureter is noteddown to the level of   the previously described 4.6 x 4.1 cm collection in the left side of the   pelvis. Findings are consistent with obstruction of the ureter at this   level.    3. No evidence of right hydronephrosis, calcified stones, or solid mass.      --- End of Report ---            KAROL FLOYD MD; Attending Interventional Radiologist  This document has been electronically signed. Mar 26 2025 10:08PM    < end of copied text >        < from: CT Abdomen and Pelvis w/ IV Cont (25 @ 15:37) >    ACC: [id number] EXAM:  CT ABDOMEN AND PELVIS IC   ORDERED BY: BESA BARDHI     PROCEDURE DATE:  2025          INTERPRETATION:  CLINICAL STATEMENT: Fever, pain, post recent   laparoscopic hysterectomy and bilateral salpingo-oophorectomy.    TECHNIQUE: Contiguous axial CT images were obtained from the lower chest   to the pubic symphysis following administration of intravenous contrast.    100 cc administered of Omnipaque 350 (0 cc discarded).  Oral contrast was   not administered.  Reformatted images in the coronal and sagittal planes   were acquired.    COMPARISON CT: 2013    OTHER STUDIES USED FOR CORRELATION: None.      FINDINGS:    LOWER CHEST: Unremarkable.    HEPATOBILIARY: Post cholecystectomy. Mild CBD dilation to 8 mm, likely   related to postcholecystectomy state. Unremarkable liver.    SPLEEN: Unremarkable.    PANCREAS: Unremarkable.    ADRENAL GLANDS: Unremarkable.    KIDNEYS: Delayed left nephrogram and mild hydroureteronephrosis to the   level of the left pelvicinflammatory changes.    ABDOMINOPELVIC NODES: No lymphadenopathy. Scattered subcentimeter   retroperitoneal lymph nodes.    PELVIC ORGANS/PERITONEUM/MESENTERY/BOWEL: Post hysterectomy and bilateral   salpingo-oophorectomies with diffuse fat stranding and trace fluid in the   pelvis. A 4.4 x 2.9 x 4.1 cm partially rim-enhancing fluid collection   containing a few foci of gas noted within the left hemipelvis, abutting   the iliac vessels and the sigmoid colon.    Post sleeve gastrectomy. No bowel obstruction or pneumoperitoneum.   Circumferential sigmoid colonic wall thickening and adjacent fat   stranding. Inflammatory changes also surround the urinary bladder.    BONES/SOFT TISSUES: Degenerative change of the spine. Postsurgical   changes inthe ventral abdominal wall.      IMPRESSION:  1.  Post recent hysterectomy and bilateral salpingo-oophorectomies with a   4.4 x 2.9 x 4.1 cm partially rim-enhancing fluid collection containing   few punctate foci of gas in the left hemipelvis, likelyreflecting known   surgical hemostatic material; an abscess cannot be excluded.  2.  Marked inflammatory changes surrounding the sigmoid colon and urinary   bladder, reactive.  3.  Delayed left nephrogram and mild left hydroureteronephrosis to the   level of the inflammatory changes in the left hemipelvis.    --- End of Report ---            JAIRO FERGUSON MD; Attending Radiologist  This document has been electronically signed. Mar 26 2025  4:45PM    < end of copied text >

## 2025-03-27 NOTE — H&P ADULT - ASSESSMENT
58 yo P2 s/p TLH BSO, lysis of adhesions for hydrosalpinx on 3/17/25 with LLQ and postoperative changes on imaging, r/o ureteral injury    -CT urogram - possible  obstruction of left ureter   -PO augmentin q12h  -NPO/IVF after midnight  -Monitor vitals    Dr. De La Cruz aware

## 2025-03-28 ENCOUNTER — TRANSCRIPTION ENCOUNTER (OUTPATIENT)
Age: 60
End: 2025-03-28

## 2025-03-28 VITALS
RESPIRATION RATE: 18 BRPM | OXYGEN SATURATION: 98 % | SYSTOLIC BLOOD PRESSURE: 109 MMHG | TEMPERATURE: 98 F | HEART RATE: 64 BPM | DIASTOLIC BLOOD PRESSURE: 74 MMHG

## 2025-03-28 LAB
ANION GAP SERPL CALC-SCNC: 10 MMOL/L — SIGNIFICANT CHANGE UP (ref 7–14)
ANION GAP SERPL CALC-SCNC: 11 MMOL/L — SIGNIFICANT CHANGE UP (ref 7–14)
BACTERIA UR CULT: ABNORMAL
BASOPHILS # BLD AUTO: 0.04 K/UL — SIGNIFICANT CHANGE UP (ref 0–0.2)
BASOPHILS NFR BLD AUTO: 0.5 % — SIGNIFICANT CHANGE UP (ref 0–1)
BUN SERPL-MCNC: 10 MG/DL — SIGNIFICANT CHANGE UP (ref 10–20)
BUN SERPL-MCNC: 8 MG/DL — LOW (ref 10–20)
CALCIUM SERPL-MCNC: 8 MG/DL — LOW (ref 8.4–10.5)
CALCIUM SERPL-MCNC: 8 MG/DL — LOW (ref 8.4–10.5)
CHLORIDE SERPL-SCNC: 105 MMOL/L — SIGNIFICANT CHANGE UP (ref 98–110)
CHLORIDE SERPL-SCNC: 105 MMOL/L — SIGNIFICANT CHANGE UP (ref 98–110)
CO2 SERPL-SCNC: 25 MMOL/L — SIGNIFICANT CHANGE UP (ref 17–32)
CO2 SERPL-SCNC: 26 MMOL/L — SIGNIFICANT CHANGE UP (ref 17–32)
CREAT SERPL-MCNC: 0.9 MG/DL — SIGNIFICANT CHANGE UP (ref 0.7–1.5)
CREAT SERPL-MCNC: 1 MG/DL — SIGNIFICANT CHANGE UP (ref 0.7–1.5)
EGFR: 65 ML/MIN/1.73M2 — SIGNIFICANT CHANGE UP
EGFR: 65 ML/MIN/1.73M2 — SIGNIFICANT CHANGE UP
EGFR: 74 ML/MIN/1.73M2 — SIGNIFICANT CHANGE UP
EGFR: 74 ML/MIN/1.73M2 — SIGNIFICANT CHANGE UP
EOSINOPHIL # BLD AUTO: 0.04 K/UL — SIGNIFICANT CHANGE UP (ref 0–0.7)
EOSINOPHIL NFR BLD AUTO: 0.5 % — SIGNIFICANT CHANGE UP (ref 0–8)
GLUCOSE SERPL-MCNC: 163 MG/DL — HIGH (ref 70–99)
GLUCOSE SERPL-MCNC: 93 MG/DL — SIGNIFICANT CHANGE UP (ref 70–99)
HCT VFR BLD CALC: 32 % — LOW (ref 37–47)
HGB BLD-MCNC: 10.6 G/DL — LOW (ref 12–16)
IMM GRANULOCYTES NFR BLD AUTO: 0.2 % — SIGNIFICANT CHANGE UP (ref 0.1–0.3)
LYMPHOCYTES # BLD AUTO: 0.96 K/UL — LOW (ref 1.2–3.4)
LYMPHOCYTES # BLD AUTO: 11.8 % — LOW (ref 20.5–51.1)
MAGNESIUM SERPL-MCNC: 1.8 MG/DL — SIGNIFICANT CHANGE UP (ref 1.8–2.4)
MCHC RBC-ENTMCNC: 31 PG — SIGNIFICANT CHANGE UP (ref 27–31)
MCHC RBC-ENTMCNC: 33.1 G/DL — SIGNIFICANT CHANGE UP (ref 32–37)
MCV RBC AUTO: 93.6 FL — SIGNIFICANT CHANGE UP (ref 81–99)
MONOCYTES # BLD AUTO: 0.67 K/UL — HIGH (ref 0.1–0.6)
MONOCYTES NFR BLD AUTO: 8.2 % — SIGNIFICANT CHANGE UP (ref 1.7–9.3)
NEUTROPHILS # BLD AUTO: 6.41 K/UL — SIGNIFICANT CHANGE UP (ref 1.4–6.5)
NEUTROPHILS NFR BLD AUTO: 78.8 % — HIGH (ref 42.2–75.2)
NRBC BLD AUTO-RTO: 0 /100 WBCS — SIGNIFICANT CHANGE UP (ref 0–0)
PHOSPHATE SERPL-MCNC: 3.1 MG/DL — SIGNIFICANT CHANGE UP (ref 2.1–4.9)
PLATELET # BLD AUTO: 182 K/UL — SIGNIFICANT CHANGE UP (ref 130–400)
PMV BLD: 11.3 FL — HIGH (ref 7.4–10.4)
POTASSIUM SERPL-MCNC: 4.2 MMOL/L — SIGNIFICANT CHANGE UP (ref 3.5–5)
POTASSIUM SERPL-MCNC: 4.3 MMOL/L — SIGNIFICANT CHANGE UP (ref 3.5–5)
POTASSIUM SERPL-SCNC: 4.2 MMOL/L — SIGNIFICANT CHANGE UP (ref 3.5–5)
POTASSIUM SERPL-SCNC: 4.3 MMOL/L — SIGNIFICANT CHANGE UP (ref 3.5–5)
RBC # BLD: 3.42 M/UL — LOW (ref 4.2–5.4)
RBC # FLD: 12.8 % — SIGNIFICANT CHANGE UP (ref 11.5–14.5)
SODIUM SERPL-SCNC: 140 MMOL/L — SIGNIFICANT CHANGE UP (ref 135–146)
SODIUM SERPL-SCNC: 142 MMOL/L — SIGNIFICANT CHANGE UP (ref 135–146)
SURGICAL PATHOLOGY STUDY: SIGNIFICANT CHANGE UP
WBC # BLD: 8.14 K/UL — SIGNIFICANT CHANGE UP (ref 4.8–10.8)
WBC # FLD AUTO: 8.14 K/UL — SIGNIFICANT CHANGE UP (ref 4.8–10.8)

## 2025-03-28 RX ORDER — ACETAMINOPHEN 500 MG/5ML
2 LIQUID (ML) ORAL
Qty: 0 | Refills: 0 | DISCHARGE
Start: 2025-03-28

## 2025-03-28 RX ADMIN — Medication 2 MILLIGRAM(S): at 04:15

## 2025-03-28 RX ADMIN — Medication 650 MILLIGRAM(S): at 00:07

## 2025-03-28 RX ADMIN — AMOXICILLIN AND CLAVULANATE POTASSIUM 1 TABLET(S): 500; 125 TABLET, FILM COATED ORAL at 00:11

## 2025-03-28 RX ADMIN — Medication 650 MILLIGRAM(S): at 00:37

## 2025-03-28 RX ADMIN — Medication 2 MILLIGRAM(S): at 00:07

## 2025-03-28 RX ADMIN — SODIUM CHLORIDE 125 MILLILITER(S): 9 INJECTION, SOLUTION INTRAVENOUS at 06:39

## 2025-03-28 RX ADMIN — Medication 650 MILLIGRAM(S): at 07:34

## 2025-03-28 RX ADMIN — Medication 650 MILLIGRAM(S): at 06:37

## 2025-03-28 RX ADMIN — Medication 2 MILLIGRAM(S): at 00:37

## 2025-03-28 NOTE — DISCHARGE NOTE PROVIDER - HOSPITAL COURSE
HPI: 58 yo P2 s/p H BSO, lysis of adhesions for hydrosalpinx on 3/17/25 presents complaining of LLQ pain. Patient reports on and off discomfort since the weekend, improved with tylenol. Reports the pain kept her up last night and she could not sleep. Reports hot flashes and occasional fevers at home. Reports she had a temperate of 100 this morning. Report 7/10 LLQ pain that radiates to her back. She took tylenol this morning. Reports an episode of vomiting last night but has been able to tolerate PO since. Denies urinary symptoms. Denies vaginal bleeding or discharge. Patient is passing gas and having bowel movements. Last bowel movement was this morning.    Labs showed increase in Cr to 1.2    Hospital course:  Admitted to GYN service. CT showing reactive changes in LLQ. Received IVF bolus and augmentin. Lower suspicion for ureteral injury.   Pain management with tylenol and IVP morphine.     < from: CT Abdomen and Pelvis w/ IV Cont (03.26.25 @ 15:37) >  ACC: [id number] EXAM:  CT ABDOMEN AND PELVIS IC    PROCEDURE DATE:  03/26/2025      INTERPRETATION:  CLINCAL STATEMENT: Fever, pain, post recent   laparoscopic hysterectomy and bilateral salpingo-oophorectomy  TECHNIQUE: Contiguous axial CT images were obtained from the lower chest   to the pubic symphysis following administration of intravenous contrast.    100 cc administered of Omnipaque 350 (0 cc discarded).  Oral contrast was   not administered.  Reformatted images in the coronal and sagittal planes   were acquired.    COMPARISON CT: April 6, 2013    OTHER STUDIES USED FOR CORRELATION: None.    FINDINGS:  LOWER CHEST: Unremarkable.  HEPATOBILIARY: Post cholecystectomy. Mild CBD dilation to 8 mm, likely   related to postcholecystectomy state. Unremarkable liver.  SPLEEN: Unremarkable.  PANCREAS: Unremarkable.  ADRENAL GLANDS: Unremarkable.  KIDNEYS: Delayed left nephrogram and mild hydroureteronephrosis to the   level of the left pelvicinflammatory changes.  ABDOMINOPELVIC NODES: No lymphadenopathy. Scattered subcentimeter   retroperitoneal lymph nodes.    PELVIC ORGANS/PERITONEUM/MESENTERY/BOWEL: Post hysterectomy and bilateral   salpingo-oophorectomies with diffuse fat stranding and trace fluid in the   pelvis. A 4.4 x 2.9 x 4.1 cm partially rim-enhancing fluid collection   containing a few foci of gas noted within the left hemipelvis, abutting   the iliac vessels and the sigmoid colon.    Post sleeve gastrectomy. No bowel obstruction or pneumoperitoneum.   Circumferential sigmoid colonic wall thickening and adjacent fat   stranding. Inflammatory changes also surround the urinary bladder.    BONES/SOFT TISSUES: Degenerative change of the spine. Postsurgical   changes inthe ventral abdominal wall.      IMPRESSION:  1.  Post recent hysterectomy and bilateral salpingo-oophorectomies with a   4.4 x 2.9 x 4.1 cm partially rim-enhancing fluid collection containing   few punctate foci of gas in the left hemipelvis, likelyreflecting known   surgical hemostatic material; an abscess cannot be excluded.  2.  Marked inflammatory changes surrounding the sigmoid colon and urinary   bladder, reactive.  3.  Delayed left nephrogram and mild left hydroureteronephrosis to the   level of the inflammatory changes in the left hemipelvis.    --- End of Report ---    ______________________  Labs showed improvement in Cr.   Discharged in stable condition.

## 2025-03-28 NOTE — DISCHARGE NOTE PROVIDER - NSDCFUADDINST_GEN_ALL_CORE_FT
ACTIVITY:   - No heavy lifting/pushing/pulling for 6 weeks. Do not lift anything more than 10 lbs (such as laundry, groceries, children, pets), vacuum, push heavy doors or grocery carts, etc, for 6 weeks.  - You may climb stairs as tolerated.  -  Do not put anything in the vagina for at least 6 weeks after surgery unless otherwise instructed by your doctor (including tampons, douching, sexual intercourse, etc).  -  No driving for 1 week after surgery and not while taking narcotic pain medication. Drive defensively when you are ready.  -  Avoid sitting or lying in bed for more than 2 hours at a time while you are awake to reduce your risk of blood clots.    PAIN MANAGEMENT:   - Tylenol – 975mg every 6 hours as needed. The maximum dose of Tylenol is 4000 mg in 24 hours.  - Oxycodone 5mg every 6 hours as needed for severe pain (limit use as this is a narcotic and can cause sedation, nausea and constipation.  -  A warm shower, heating pad, and/or walking may help.    WHAT TO EXPECT AT HOME  - Recovery from surgery is generally 2-4 weeks, but sometimes longer for more strenuous activity. It is normal to be very tired during this time.  - It is normal to have some drainage or a small amount of vaginal bleeding after surgery that would require the use of a light pantiliner. This discharge may last up to 6 weeks. The bleeding and discharge should be light and should have no odor.  - You may experience gas pain, abdominal swelling, or shoulder pain for 24-72 hours after surgery. This is from the carbon dioxide gas put into your abdomen to better visualize your organs. A warm shower, heating pad, and/or walking may help.    WHEN TO CALL YOUR DOCTOR:  - Fever (>100.4°F or 38.0°C) or chills  - Incision problems such as redness, warmth, swelling, or foul smelling drainage.  - Severe nausea or persistent vomiting.  - Bright red vaginal bleeding (soaking >1 pad/hour) or foul smelling vaginal drainage.  - Severe pain not relieved with pain medication.  - Pain with urination, cloudy urine, or foul smelling urine.  - Or if you have any other problems or questions.

## 2025-03-28 NOTE — DISCHARGE NOTE PROVIDER - NSDCCPCAREPLAN_GEN_ALL_CORE_FT
PRINCIPAL DISCHARGE DIAGNOSIS  Diagnosis: Abdominal pain  Assessment and Plan of Treatment:       SECONDARY DISCHARGE DIAGNOSES  Diagnosis: Fever postop  Assessment and Plan of Treatment:

## 2025-03-28 NOTE — PROGRESS NOTE ADULT - SUBJECTIVE AND OBJECTIVE BOX
PGY3 Note    POD #: 11  HD #: 3    Chief Complaint: LLQ pain    HPI: Pt seen and examined at bedside. No events overnight. LLQ pain controlled with Tylenol and IVP morphine.  She is ambulating, voiding spontaneously without pain or hematuria and with adequate volume per patient. Was tolerating PO last night, is now NPO after midnight. Denies HA, CP, SOB, nausea, vomiting, fevers, chills, changes in bowel habits.  Denies vaginal bleeding, vaginal discharge, or leakage of watery fluid from the vagina.     PAST MEDICAL & SURGICAL HISTORY:  Cervical cancer 30 years ago  Afib  H/O gastric sleeve  H/O:   S/P total hysterectomy and bilateral salpingo-oophorectomy    Physical Exam  Vital Signs Last 24 Hrs  T(C): 37.7 (28 Mar 2025 05:24), Max: 37.7 (28 Mar 2025 05:24)  T(F): 99.9 (28 Mar 2025 05:24), Max: 99.9 (28 Mar 2025 05:24)  HR: 84 (28 Mar 2025 05:24) (68 - 84)  BP: 120/76 (28 Mar 2025 05:24) (107/70 - 122/62)  BP(mean): 82 (27 Mar 2025 20:35) (82 - 82)  RR: 18 (28 Mar 2025 05:24) (18 - 18)  SpO2: 98% (28 Mar 2025 05:24) (97% - 98%)    Parameters below as of 28 Mar 2025 05:24  Patient On (Oxygen Delivery Method): room air    Physical exam:  General - AAOx3, NAD  Abdomen: - Soft, nontender, nondistended  Extremities - No calf tenderness, no swelling    Labs:                        12.7   8.74  )-----------( 163      ( 26 Mar 2025 15:53 )             39.6     139  |  103  |  19  ----------------------------<71  4.9  |  19  |  1.2      < from: CT Abdomen and Pelvis Urogram w/wo IV Cont (25 @ 20:51) >    ACC: [id number] EXAM:  CT ABD PELV UROGRAM WAW IC   ORDERED BY: ALEXA MORALES     PROCEDURE DATE:  2025          INTERPRETATION:  CLINICAL INFORMATION: Mild hydronephrosis. Evaluate for   ureteral injury . WBC 8.74    PMHx of TLH BSOon 3/26 with LLQ pain    COMPARISON: CT scan of the abdomen and pelvis dated 3/26/2025 at 3:35 PM    CONTRAST/COMPLICATIONS:  IV Contrast:   The patient received 100 ml of Omnipaque 350 with 0 ml   discarded.  Oral Contrast:  None  Complications:  None reported at time of study completion    PROCEDURE:  CT of the Abdomen and Pelvis was performed.  Sagittal and coronal reformats were performed.    FINDINGS:    TUBES AND LINES: None.  LOWER CHEST: There are mild dependent atelectatic changes at thelung   bases. No pleural or pericardial effusion.    LIVER: The liver is normal in size with no evidence of solid mass. The   portal vein is patent. The hepatic veins are opacified.  BILE DUCTS: Normal caliber.  GALLBLADDER: Post cholecystectomy  SPLEEN: Within normal limits.  PANCREAS: The pancreas is normal in size and configuration. No evidence   of mass or pancreatitis.  ADRENALS: Within normal limits.    KIDNEYS/URETERS: Mild delayed left renal enhancement. Mild left   perinephric stranding.Mild left hydronephrosis and hydroureter is noted   down to the level of the previously described 4.6 x 4.1 cm collection in   the left side of the pelvis. Findings are consistent with obstruction of   the ureter at this level.    No evidence of right hydronephrosis, calcified stones, or solid mass.    BLADDER: Contrast material is present within the bladder.  REPRODUCTIVE ORGANS: Post hysterectomy and bilateral   salpingo-oophorectomy.    BOWEL: Post sleeve gastrectomy. Mild fat stranding in themid pelvis   compatible with post surgical change. No evidence of bowel obstruction,   colitis, or inflammatory process. Normal caliber appendix.  PERITONEUM/RETROPERITONEUM: No ascites. No free intraperitoneal air.  VESSELS: Normal caliber aorta.  LYMPH NODES: No abdominal or pelvic adenopathy.  BONES: Degenerative changes of the spine.  ABDOMINAL WALL: Unremarkable    IMPRESSION:    1. Post hysterectomy and bilateral salpingo-oophorectomy.    2. Mild left hydronephrosis and hydroureter is noteddown to the level of   the previously described 4.6 x 4.1 cm collection in the left side of the   pelvis. Findings are consistent with obstruction of the ureter at this   level.    3. No evidence of right hydronephrosis, calcified stones, or solid mass.      --- End of Report ---            KAROL FLOYD MD; Attending Interventional Radiologist  This document has been electronically signed. Mar 26 2025 10:08PM    < end of copied text >        < from: CT Abdomen and Pelvis w/ IV Cont (25 @ 15:37) >    ACC: [id number] EXAM:  CT ABDOMEN AND PELVIS IC   ORDERED BY: INDIRAPRECIOUS KOHLI     PROCEDURE DATE:  2025          INTERPRETATION:  CLINICAL STATEMENT: Fever, pain, post recent   laparoscopic hysterectomy and bilateral salpingo-oophorectomy.    TECHNIQUE: Contiguous axial CT images were obtained from the lower chest   to the pubic symphysis following administration of intravenous contrast.    100 cc administered of Omnipaque 350 (0 cc discarded).  Oral contrast was   not administered.  Reformatted images in the coronal and sagittal planes   were acquired.    COMPARISON CT: 2013    OTHER STUDIES USED FOR CORRELATION: None.      FINDINGS:    LOWER CHEST: Unremarkable.    HEPATOBILIARY: Post cholecystectomy. Mild CBD dilation to 8 mm, likely   related to postcholecystectomy state. Unremarkable liver.    SPLEEN: Unremarkable.    PANCREAS: Unremarkable.    ADRENAL GLANDS: Unremarkable.    KIDNEYS: Delayed left nephrogram and mild hydroureteronephrosis to the   level of the left pelvicinflammatory changes.    ABDOMINOPELVIC NODES: No lymphadenopathy. Scattered subcentimeter   retroperitoneal lymph nodes.    PELVIC ORGANS/PERITONEUM/MESENTERY/BOWEL: Post hysterectomy and bilateral   salpingo-oophorectomies with diffuse fat stranding and trace fluid in the   pelvis. A 4.4 x 2.9 x 4.1 cm partially rim-enhancing fluid collection   containing a few foci of gas noted within the left hemipelvis, abutting   the iliac vessels and the sigmoid colon.    Post sleeve gastrectomy. No bowel obstruction or pneumoperitoneum.   Circumferential sigmoid colonic wall thickening and adjacent fat   stranding. Inflammatory changes also surround the urinary bladder.    BONES/SOFT TISSUES: Degenerative change of the spine. Postsurgical   changes inthe ventral abdominal wall.      IMPRESSION:  1.  Post recent hysterectomy and bilateral salpingo-oophorectomies with a   4.4 x 2.9 x 4.1 cm partially rim-enhancing fluid collection containing   few punctate foci of gas in the left hemipelvis, likelyreflecting known   surgical hemostatic material; an abscess cannot be excluded.  2.  Marked inflammatory changes surrounding the sigmoid colon and urinary   bladder, reactive.  3.  Delayed left nephrogram and mild left hydroureteronephrosis to the   level of the inflammatory changes in the left hemipelvis.    --- End of Report ---            JAIRO FERGUSON MD; Attending Radiologist  This document has been electronically signed. Mar 26 2025  4:45PM    < end of copied text >   PGY3 Note    POD #: 11  HD #: 3    Chief Complaint: LLQ pain    HPI: Pt seen and examined at bedside. No events overnight. Reports she still has LLQ pain, overnight controlled with Tylenol and IVP morphine.  She is ambulating, voiding spontaneously without pain or hematuria and with adequate volume per patient. Tolerating PO. Denies HA, CP, SOB, nausea, vomiting, fevers, chills, changes in bowel habits.  Denies vaginal bleeding, vaginal discharge, or leakage of watery fluid from the vagina.     PAST MEDICAL & SURGICAL HISTORY:  Cervical cancer 30 years ago  Afib  H/O gastric sleeve  H/O:   S/P total hysterectomy and bilateral salpingo-oophorectomy    Physical Exam  Vital Signs Last 24 Hrs  T(C): 37.7 (28 Mar 2025 05:24), Max: 37.7 (28 Mar 2025 05:24)  T(F): 99.9 (28 Mar 2025 05:24), Max: 99.9 (28 Mar 2025 05:24)  HR: 84 (28 Mar 2025 05:24) (68 - 84)  BP: 120/76 (28 Mar 2025 05:24) (107/70 - 122/62)  BP(mean): 82 (27 Mar 2025 20:35) (82 - 82)  RR: 18 (28 Mar 2025 05:24) (18 - 18)  SpO2: 98% (28 Mar 2025 05:24) (97% - 98%)    Parameters below as of 28 Mar 2025 05:24  Patient On (Oxygen Delivery Method): room air    Physical exam:  General - AAOx3, NAD  Abdomen: - Soft, nontender, nondistended  Extremities - No calf tenderness, no swelling    Labs:                        12.7   8.74  )-----------( 163      ( 26 Mar 2025 15:53 )             39.6     139  |  103  |  19  ----------------------------<71  4.9  |  19  |  1.2      < from: CT Abdomen and Pelvis Urogram w/wo IV Cont (25 @ 20:51) >    ACC: [id number] EXAM:  CT ABD PELV UROGRAM WAW IC   ORDERED BY: MADHUMITA   GOVINDASWAMY     PROCEDURE DATE:  2025          INTERPRETATION:  CLINICAL INFORMATION: Mild hydronephrosis. Evaluate for   ureteral injury . WBC 8.74    PMHx of TLH BSOon 3/26 with LLQ pain    COMPARISON: CT scan of the abdomen and pelvis dated 3/26/2025 at 3:35 PM    CONTRAST/COMPLICATIONS:  IV Contrast:   The patient received 100 ml of Omnipaque 350 with 0 ml   discarded.  Oral Contrast:  None  Complications:  None reported at time of study completion    PROCEDURE:  CT of the Abdomen and Pelvis was performed.  Sagittal and coronal reformats were performed.    FINDINGS:    TUBES AND LINES: None.  LOWER CHEST: There are mild dependent atelectatic changes at thelung   bases. No pleural or pericardial effusion.    LIVER: The liver is normal in size with no evidence of solid mass. The   portal vein is patent. The hepatic veins are opacified.  BILE DUCTS: Normal caliber.  GALLBLADDER: Post cholecystectomy  SPLEEN: Within normal limits.  PANCREAS: The pancreas is normal in size and configuration. No evidence   of mass or pancreatitis.  ADRENALS: Within normal limits.    KIDNEYS/URETERS: Mild delayed left renal enhancement. Mild left   perinephric stranding.Mild left hydronephrosis and hydroureter is noted   down to the level of the previously described 4.6 x 4.1 cm collection in   the left side of the pelvis. Findings are consistent with obstruction of   the ureter at this level.    No evidence of right hydronephrosis, calcified stones, or solid mass.    BLADDER: Contrast material is present within the bladder.  REPRODUCTIVE ORGANS: Post hysterectomy and bilateral   salpingo-oophorectomy.    BOWEL: Post sleeve gastrectomy. Mild fat stranding in themid pelvis   compatible with post surgical change. No evidence of bowel obstruction,   colitis, or inflammatory process. Normal caliber appendix.  PERITONEUM/RETROPERITONEUM: No ascites. No free intraperitoneal air.  VESSELS: Normal caliber aorta.  LYMPH NODES: No abdominal or pelvic adenopathy.  BONES: Degenerative changes of the spine.  ABDOMINAL WALL: Unremarkable    IMPRESSION:    1. Post hysterectomy and bilateral salpingo-oophorectomy.    2. Mild left hydronephrosis and hydroureter is noteddown to the level of   the previously described 4.6 x 4.1 cm collection in the left side of the   pelvis. Findings are consistent with obstruction of the ureter at this   level.    3. No evidence of right hydronephrosis, calcified stones, or solid mass.      --- End of Report ---            KAROL FLOYD MD; Attending Interventional Radiologist  This document has been electronically signed. Mar 26 2025 10:08PM    < end of copied text >        < from: CT Abdomen and Pelvis w/ IV Cont (25 @ 15:37) >    ACC: [id number] EXAM:  CT ABDOMEN AND PELVIS IC   ORDERED BY: INDIRAPRECIOUS KOHLI     PROCEDURE DATE:  2025          INTERPRETATION:  CLINICAL STATEMENT: Fever, pain, post recent   laparoscopic hysterectomy and bilateral salpingo-oophorectomy.    TECHNIQUE: Contiguous axial CT images were obtained from the lower chest   to the pubic symphysis following administration of intravenous contrast.    100 cc administered of Omnipaque 350 (0 cc discarded).  Oral contrast was   not administered.  Reformatted images in the coronal and sagittal planes   were acquired.    COMPARISON CT: 2013    OTHER STUDIES USED FOR CORRELATION: None.      FINDINGS:    LOWER CHEST: Unremarkable.    HEPATOBILIARY: Post cholecystectomy. Mild CBD dilation to 8 mm, likely   related to postcholecystectomy state. Unremarkable liver.    SPLEEN: Unremarkable.    PANCREAS: Unremarkable.    ADRENAL GLANDS: Unremarkable.    KIDNEYS: Delayed left nephrogram and mild hydroureteronephrosis to the   level of the left pelvicinflammatory changes.    ABDOMINOPELVIC NODES: No lymphadenopathy. Scattered subcentimeter   retroperitoneal lymph nodes.    PELVIC ORGANS/PERITONEUM/MESENTERY/BOWEL: Post hysterectomy and bilateral   salpingo-oophorectomies with diffuse fat stranding and trace fluid in the   pelvis. A 4.4 x 2.9 x 4.1 cm partially rim-enhancing fluid collection   containing a few foci of gas noted within the left hemipelvis, abutting   the iliac vessels and the sigmoid colon.    Post sleeve gastrectomy. No bowel obstruction or pneumoperitoneum.   Circumferential sigmoid colonic wall thickening and adjacent fat   stranding. Inflammatory changes also surround the urinary bladder.    BONES/SOFT TISSUES: Degenerative change of the spine. Postsurgical   changes inthe ventral abdominal wall.      IMPRESSION:  1.  Post recent hysterectomy and bilateral salpingo-oophorectomies with a   4.4 x 2.9 x 4.1 cm partially rim-enhancing fluid collection containing   few punctate foci of gas in the left hemipelvis, likelyreflecting known   surgical hemostatic material; an abscess cannot be excluded.  2.  Marked inflammatory changes surrounding the sigmoid colon and urinary   bladder, reactive.  3.  Delayed left nephrogram and mild left hydroureteronephrosis to the   level of the inflammatory changes in the left hemipelvis.    --- End of Report ---            JAIRO FERGUSON MD; Attending Radiologist  This document has been electronically signed. Mar 26 2025  4:45PM    < end of copied text >

## 2025-03-28 NOTE — DISCHARGE NOTE NURSING/CASE MANAGEMENT/SOCIAL WORK - PATIENT PORTAL LINK FT
You can access the FollowMyHealth Patient Portal offered by [facility] by registering at the following website: [email or web address]. By joining [employer]’s FollowMyHealth portal, you will also be able to view your health information using other applications (apps) compatible with our system.

## 2025-03-28 NOTE — PROGRESS NOTE ADULT - ASSESSMENT
58 yo P2 s/p TLH BSO, lysis of adhesions for hydrosalpinx on 3/17/25, POD 11, with LLQ pain and postoperative changes on imaging, likely reactive changes, low suspicion for ureteral injury    -diet: regular  -activity: ambulate as tolerated  -DVT ppx: SCDs  -monitor vitals routine  -pain management: tylenol + morphine PRN  -continue IVF hydration   -continue home medications  -PO augmentin q12h  -s/p lasix x1 with good UO  -trend Cr: 1.2 --> 1  -f/u AM labs     58 yo P2 s/p TLH BSO, lysis of adhesions for hydrosalpinx on 3/17/25, POD 11, with LLQ pain and postoperative changes on imaging, likely reactive changes, low suspicion for ureteral injury    -diet: regular  -activity: ambulate as tolerated  -DVT ppx: SCDs  -monitor vitals routine  -pain management: tylenol   -continue IVF hydration   -continue home medications  -PO augmentin q12h  -s/p lasix x1 with good UO  -trend Cr: 1.2 --> 1  -f/u AM labs     60 yo P2 s/p TLH BSO, lysis of adhesions for hydrosalpinx on 3/17/25, POD 11, with LLQ pain and postoperative changes on imaging, likely reactive changes, low suspicion for ureteral injury    -diet: regular  -activity: ambulate as tolerated  -DVT ppx: SCDs  -monitor vitals routine  -pain management: tylenol   -Stop IVF  -continue home medications  -PO augmentin q12h  -s/p lasix x1 with good UO  -trend Cr: 1.2 --> 1  -f/u AM labs

## 2025-03-28 NOTE — DISCHARGE NOTE PROVIDER - CARE PROVIDER_API CALL
Cristela Luna  Gynecologic Oncology  [address] [place] [address], [address] [place] [address]  Phone: [phone]  Fax: [phone]  Follow Up Time:

## 2025-03-28 NOTE — DISCHARGE NOTE NURSING/CASE MANAGEMENT/SOCIAL WORK - FINANCIAL ASSISTANCE
[facility] provides services at a reduced cost to those who are determined to be eligible through [facility]’s financial assistance program. Information regarding [facility]’s financial assistance program can be found by going to [email or web address] or by calling [phone].

## 2025-03-28 NOTE — DISCHARGE NOTE PROVIDER - NSDCMRMEDTOKEN_GEN_ALL_CORE_FT
Colace 100 mg oral capsule: 1 cap(s) orally 2 times a day  ibuprofen 600 mg oral tablet: 1 tab(s) orally every 6 hours  oxyCODONE 5 mg oral tablet: 1 tab(s) orally every 6 hours MDD: 4  simethicone 80 mg oral tablet, chewable: 1 tab(s) chewed every 6 hours  tirzepatide 5 mg/0.5 mL subcutaneous solution: 5 milligram(s) subcutaneously once a week  Tylenol 325 mg oral tablet: 2 tab(s) orally every 6 hours   ibuprofen 600 mg oral tablet: 1 tab(s) orally every 6 hours  Tylenol 325 mg oral tablet: 2 tab(s) orally every 6 hours As needed Moderate Pain (4 - 6)

## 2025-03-28 NOTE — DISCHARGE NOTE PROVIDER - NSDCFUSCHEDAPPT_GEN_ALL_CORE_FT
Cristela Luna  [facility]  GYNONC [address]  Scheduled Appointment: 05/13/2025    [facility]  CARDIOLOGY [address]  Scheduled Appointment: 05/13/2025    Anne Montoya  [facility]  CARDIOLOGY [address]  Scheduled Appointment: 05/20/2025

## 2025-04-04 DIAGNOSIS — Z90.710 ACQUIRED ABSENCE OF BOTH CERVIX AND UTERUS: ICD-10-CM

## 2025-04-04 DIAGNOSIS — R10.32 LEFT LOWER QUADRANT PAIN: ICD-10-CM

## 2025-04-04 DIAGNOSIS — Z90.721 ACQUIRED ABSENCE OF BOTH CERVIX AND UTERUS: ICD-10-CM

## 2025-04-04 DIAGNOSIS — I48.91 UNSPECIFIED ATRIAL FIBRILLATION: ICD-10-CM

## 2025-04-04 DIAGNOSIS — E89.41 SYMPTOMATIC POSTPROCEDURAL OVARIAN FAILURE: ICD-10-CM

## 2025-04-04 DIAGNOSIS — Z85.41 PERSONAL HISTORY OF MALIGNANT NEOPLASM OF CERVIX UTERI: ICD-10-CM

## 2025-04-04 DIAGNOSIS — Z90.722 ACQUIRED ABSENCE OF OVARIES, BILATERAL: ICD-10-CM

## 2025-04-04 DIAGNOSIS — Z79.01 LONG TERM (CURRENT) USE OF ANTICOAGULANTS: ICD-10-CM

## 2025-04-04 DIAGNOSIS — R50.82 POSTPROCEDURAL FEVER: ICD-10-CM

## 2025-04-04 DIAGNOSIS — Z86.79 PERSONAL HISTORY OF OTHER DISEASES OF THE CIRCULATORY SYSTEM: ICD-10-CM

## 2025-04-18 RX ORDER — ESTRADIOL 0.1 MG/D
0.1 PATCH, EXTENDED RELEASE TRANSDERMAL
Qty: 8 | Refills: 6 | Status: ACTIVE | COMMUNITY
Start: 2025-04-18 | End: 1900-01-01

## 2025-05-13 ENCOUNTER — APPOINTMENT (OUTPATIENT)
Dept: CARDIOLOGY | Facility: CLINIC | Age: 60
End: 2025-05-13
Payer: COMMERCIAL

## 2025-05-13 ENCOUNTER — APPOINTMENT (OUTPATIENT)
Dept: GYNECOLOGIC ONCOLOGY | Facility: CLINIC | Age: 60
End: 2025-05-13
Payer: COMMERCIAL

## 2025-05-13 VITALS
HEART RATE: 59 BPM | SYSTOLIC BLOOD PRESSURE: 141 MMHG | BODY MASS INDEX: 27.82 KG/M2 | WEIGHT: 167 LBS | DIASTOLIC BLOOD PRESSURE: 79 MMHG | HEIGHT: 65 IN

## 2025-05-13 DIAGNOSIS — Z90.721 ACQUIRED ABSENCE OF BOTH CERVIX AND UTERUS: ICD-10-CM

## 2025-05-13 DIAGNOSIS — Z90.710 ACQUIRED ABSENCE OF BOTH CERVIX AND UTERUS: ICD-10-CM

## 2025-05-13 DIAGNOSIS — B37.2 CANDIDIASIS OF SKIN AND NAIL: ICD-10-CM

## 2025-05-13 PROCEDURE — 93306 TTE W/DOPPLER COMPLETE: CPT

## 2025-05-13 PROCEDURE — 99024 POSTOP FOLLOW-UP VISIT: CPT

## 2025-05-13 RX ORDER — CLOTRIMAZOLE AND BETAMETHASONE DIPROPIONATE 10; .5 MG/G; MG/G
1-0.05 CREAM TOPICAL TWICE DAILY
Qty: 1 | Refills: 3 | Status: ACTIVE | COMMUNITY
Start: 2025-05-13 | End: 1900-01-01

## 2025-05-20 ENCOUNTER — APPOINTMENT (OUTPATIENT)
Dept: CARDIOLOGY | Facility: CLINIC | Age: 60
End: 2025-05-20
Payer: COMMERCIAL

## 2025-05-20 VITALS
HEART RATE: 53 BPM | HEIGHT: 65 IN | SYSTOLIC BLOOD PRESSURE: 115 MMHG | BODY MASS INDEX: 28.32 KG/M2 | WEIGHT: 170 LBS | DIASTOLIC BLOOD PRESSURE: 75 MMHG

## 2025-05-20 DIAGNOSIS — E87.5 HYPERKALEMIA: ICD-10-CM

## 2025-05-20 DIAGNOSIS — Z86.79 PERSONAL HISTORY OF OTHER DISEASES OF THE CIRCULATORY SYSTEM: ICD-10-CM

## 2025-05-20 DIAGNOSIS — G47.33 OBSTRUCTIVE SLEEP APNEA (ADULT) (PEDIATRIC): ICD-10-CM

## 2025-05-20 DIAGNOSIS — I48.0 PAROXYSMAL ATRIAL FIBRILLATION: ICD-10-CM

## 2025-05-20 PROCEDURE — 93000 ELECTROCARDIOGRAM COMPLETE: CPT

## 2025-05-20 PROCEDURE — 99214 OFFICE O/P EST MOD 30 MIN: CPT | Mod: 25

## 2025-06-24 NOTE — ED CDU PROVIDER INITIAL DAY NOTE - CPE EDP RESP NORM
Noted to be in atrial flutter per telemetry from 6/20-6/23 with rates in the 80s-90s  Discussed high bleeding risk with daughter, and explained that we will not be resuming anticoagulation at this time    Plan:  Rate control as above   normal...

## 2025-07-08 ENCOUNTER — APPOINTMENT (OUTPATIENT)
Dept: GYNECOLOGIC ONCOLOGY | Facility: CLINIC | Age: 60
End: 2025-07-08
Payer: COMMERCIAL

## 2025-07-08 VITALS
HEIGHT: 65 IN | BODY MASS INDEX: 28.32 KG/M2 | WEIGHT: 170 LBS | DIASTOLIC BLOOD PRESSURE: 84 MMHG | HEART RATE: 69 BPM | SYSTOLIC BLOOD PRESSURE: 134 MMHG

## 2025-07-08 PROCEDURE — 99213 OFFICE O/P EST LOW 20 MIN: CPT

## 2025-07-08 PROCEDURE — 99459 PELVIC EXAMINATION: CPT | Mod: NC

## 2025-07-15 ENCOUNTER — APPOINTMENT (OUTPATIENT)
Dept: GYNECOLOGIC ONCOLOGY | Facility: CLINIC | Age: 60
End: 2025-07-15